# Patient Record
Sex: MALE | Race: BLACK OR AFRICAN AMERICAN | Employment: OTHER | URBAN - METROPOLITAN AREA
[De-identification: names, ages, dates, MRNs, and addresses within clinical notes are randomized per-mention and may not be internally consistent; named-entity substitution may affect disease eponyms.]

---

## 2020-11-25 ENCOUNTER — HOSPITAL ENCOUNTER (INPATIENT)
Age: 70
LOS: 4 days | Discharge: HOME | DRG: 378 | End: 2020-11-29
Attending: EMERGENCY MEDICINE | Admitting: INTERNAL MEDICINE
Payer: MEDICARE

## 2020-11-25 ENCOUNTER — APPOINTMENT (OUTPATIENT)
Dept: GENERAL RADIOLOGY | Age: 70
DRG: 378 | End: 2020-11-25
Payer: MEDICARE

## 2020-11-25 ENCOUNTER — APPOINTMENT (OUTPATIENT)
Dept: CT IMAGING | Age: 70
DRG: 378 | End: 2020-11-25
Payer: MEDICARE

## 2020-11-25 PROBLEM — K92.2 ACUTE GI BLEEDING: Status: ACTIVE | Noted: 2020-11-25

## 2020-11-25 LAB
ALBUMIN SERPL-MCNC: 4.6 G/DL (ref 3.4–5)
ALP BLD-CCNC: 64 U/L (ref 40–129)
ALT SERPL-CCNC: 15 U/L (ref 10–40)
ANION GAP SERPL CALCULATED.3IONS-SCNC: 14 MMOL/L (ref 3–16)
AST SERPL-CCNC: 22 U/L (ref 15–37)
BACTERIA: ABNORMAL /HPF
BASOPHILS ABSOLUTE: 0 K/UL (ref 0–0.2)
BASOPHILS ABSOLUTE: 0 K/UL (ref 0–0.2)
BASOPHILS RELATIVE PERCENT: 0.1 %
BASOPHILS RELATIVE PERCENT: 0.2 %
BILIRUB SERPL-MCNC: 0.4 MG/DL (ref 0–1)
BILIRUBIN DIRECT: <0.2 MG/DL (ref 0–0.3)
BILIRUBIN URINE: NEGATIVE
BILIRUBIN, INDIRECT: NORMAL MG/DL (ref 0–1)
BLOOD, URINE: NEGATIVE
BUN BLDV-MCNC: 15 MG/DL (ref 7–20)
CALCIUM SERPL-MCNC: 9.6 MG/DL (ref 8.3–10.6)
CHLORIDE BLD-SCNC: 95 MMOL/L (ref 99–110)
CLARITY: CLEAR
CO2: 26 MMOL/L (ref 21–32)
COLOR: YELLOW
CREAT SERPL-MCNC: 1 MG/DL (ref 0.8–1.3)
EKG ATRIAL RATE: 89 BPM
EKG DIAGNOSIS: NORMAL
EKG P-R INTERVAL: 280 MS
EKG Q-T INTERVAL: 374 MS
EKG QRS DURATION: 104 MS
EKG QTC CALCULATION (BAZETT): 455 MS
EKG R AXIS: 46 DEGREES
EKG T AXIS: 8 DEGREES
EKG VENTRICULAR RATE: 89 BPM
EOSINOPHILS ABSOLUTE: 0 K/UL (ref 0–0.6)
EOSINOPHILS ABSOLUTE: 0 K/UL (ref 0–0.6)
EOSINOPHILS RELATIVE PERCENT: 0 %
EOSINOPHILS RELATIVE PERCENT: 0.3 %
EPITHELIAL CELLS, UA: ABNORMAL /HPF (ref 0–5)
GFR AFRICAN AMERICAN: >60
GFR NON-AFRICAN AMERICAN: >60
GLUCOSE BLD-MCNC: 115 MG/DL (ref 70–99)
GLUCOSE BLD-MCNC: 140 MG/DL (ref 70–99)
GLUCOSE URINE: NEGATIVE MG/DL
HCT VFR BLD CALC: 34.8 % (ref 40.5–52.5)
HCT VFR BLD CALC: 36.2 % (ref 40.5–52.5)
HEMOGLOBIN: 11 G/DL (ref 13.5–17.5)
HEMOGLOBIN: 11.3 G/DL (ref 13.5–17.5)
KETONES, URINE: >=80 MG/DL
LACTIC ACID: 1.7 MMOL/L (ref 0.4–2)
LEUKOCYTE ESTERASE, URINE: NEGATIVE
LIPASE: 24 U/L (ref 13–60)
LYMPHOCYTES ABSOLUTE: 0.9 K/UL (ref 1–5.1)
LYMPHOCYTES ABSOLUTE: 0.9 K/UL (ref 1–5.1)
LYMPHOCYTES RELATIVE PERCENT: 7.7 %
LYMPHOCYTES RELATIVE PERCENT: 7.9 %
MCH RBC QN AUTO: 24.8 PG (ref 26–34)
MCH RBC QN AUTO: 25.2 PG (ref 26–34)
MCHC RBC AUTO-ENTMCNC: 31.2 G/DL (ref 31–36)
MCHC RBC AUTO-ENTMCNC: 31.6 G/DL (ref 31–36)
MCV RBC AUTO: 79.4 FL (ref 80–100)
MCV RBC AUTO: 79.9 FL (ref 80–100)
MICROSCOPIC EXAMINATION: YES
MONOCYTES ABSOLUTE: 0.6 K/UL (ref 0–1.3)
MONOCYTES ABSOLUTE: 0.6 K/UL (ref 0–1.3)
MONOCYTES RELATIVE PERCENT: 4.7 %
MONOCYTES RELATIVE PERCENT: 5.6 %
MUCUS: ABNORMAL /LPF
NEUTROPHILS ABSOLUTE: 10.3 K/UL (ref 1.7–7.7)
NEUTROPHILS ABSOLUTE: 9.6 K/UL (ref 1.7–7.7)
NEUTROPHILS RELATIVE PERCENT: 86.4 %
NEUTROPHILS RELATIVE PERCENT: 87.1 %
NITRITE, URINE: NEGATIVE
OVALOCYTES: ABNORMAL
PDW BLD-RTO: 17.1 % (ref 12.4–15.4)
PDW BLD-RTO: 17.6 % (ref 12.4–15.4)
PERFORMED ON: ABNORMAL
PH UA: 6.5 (ref 5–8)
PLATELET # BLD: 182 K/UL (ref 135–450)
PLATELET # BLD: 198 K/UL (ref 135–450)
PLATELET SLIDE REVIEW: ADEQUATE
PLATELET SLIDE REVIEW: ADEQUATE
PMV BLD AUTO: 8.6 FL (ref 5–10.5)
PMV BLD AUTO: 9 FL (ref 5–10.5)
POTASSIUM REFLEX MAGNESIUM: 4.4 MMOL/L (ref 3.5–5.1)
PROTEIN UA: ABNORMAL MG/DL
RBC # BLD: 4.36 M/UL (ref 4.2–5.9)
RBC # BLD: 4.56 M/UL (ref 4.2–5.9)
RBC UA: ABNORMAL /HPF (ref 0–4)
SLIDE REVIEW: ABNORMAL
SLIDE REVIEW: ABNORMAL
SODIUM BLD-SCNC: 135 MMOL/L (ref 136–145)
SPECIFIC GRAVITY UA: 1.02 (ref 1–1.03)
TOTAL PROTEIN: 8.2 G/DL (ref 6.4–8.2)
URINE TYPE: ABNORMAL
UROBILINOGEN, URINE: 0.2 E.U./DL
WBC # BLD: 11.1 K/UL (ref 4–11)
WBC # BLD: 11.9 K/UL (ref 4–11)
WBC UA: ABNORMAL /HPF (ref 0–5)

## 2020-11-25 PROCEDURE — C9113 INJ PANTOPRAZOLE SODIUM, VIA: HCPCS | Performed by: INTERNAL MEDICINE

## 2020-11-25 PROCEDURE — 99283 EMERGENCY DEPT VISIT LOW MDM: CPT

## 2020-11-25 PROCEDURE — 83036 HEMOGLOBIN GLYCOSYLATED A1C: CPT

## 2020-11-25 PROCEDURE — 1200000000 HC SEMI PRIVATE

## 2020-11-25 PROCEDURE — 96376 TX/PRO/DX INJ SAME DRUG ADON: CPT

## 2020-11-25 PROCEDURE — 36415 COLL VENOUS BLD VENIPUNCTURE: CPT

## 2020-11-25 PROCEDURE — 93005 ELECTROCARDIOGRAM TRACING: CPT | Performed by: INTERNAL MEDICINE

## 2020-11-25 PROCEDURE — 80076 HEPATIC FUNCTION PANEL: CPT

## 2020-11-25 PROCEDURE — 81001 URINALYSIS AUTO W/SCOPE: CPT

## 2020-11-25 PROCEDURE — 96375 TX/PRO/DX INJ NEW DRUG ADDON: CPT

## 2020-11-25 PROCEDURE — C9113 INJ PANTOPRAZOLE SODIUM, VIA: HCPCS | Performed by: EMERGENCY MEDICINE

## 2020-11-25 PROCEDURE — 93005 ELECTROCARDIOGRAM TRACING: CPT | Performed by: EMERGENCY MEDICINE

## 2020-11-25 PROCEDURE — 6360000004 HC RX CONTRAST MEDICATION: Performed by: EMERGENCY MEDICINE

## 2020-11-25 PROCEDURE — 6360000002 HC RX W HCPCS: Performed by: EMERGENCY MEDICINE

## 2020-11-25 PROCEDURE — 96374 THER/PROPH/DIAG INJ IV PUSH: CPT

## 2020-11-25 PROCEDURE — 83605 ASSAY OF LACTIC ACID: CPT

## 2020-11-25 PROCEDURE — 85025 COMPLETE CBC W/AUTO DIFF WBC: CPT

## 2020-11-25 PROCEDURE — 74022 RADEX COMPL AQT ABD SERIES: CPT

## 2020-11-25 PROCEDURE — 6370000000 HC RX 637 (ALT 250 FOR IP): Performed by: INTERNAL MEDICINE

## 2020-11-25 PROCEDURE — 2580000003 HC RX 258: Performed by: INTERNAL MEDICINE

## 2020-11-25 PROCEDURE — 6360000002 HC RX W HCPCS: Performed by: INTERNAL MEDICINE

## 2020-11-25 PROCEDURE — 74177 CT ABD & PELVIS W/CONTRAST: CPT

## 2020-11-25 PROCEDURE — 80048 BASIC METABOLIC PNL TOTAL CA: CPT

## 2020-11-25 PROCEDURE — 82272 OCCULT BLD FECES 1-3 TESTS: CPT

## 2020-11-25 PROCEDURE — 83690 ASSAY OF LIPASE: CPT

## 2020-11-25 RX ORDER — METHOCARBAMOL 750 MG/1
750 TABLET, FILM COATED ORAL 4 TIMES DAILY
COMMUNITY

## 2020-11-25 RX ORDER — PROMETHAZINE HYDROCHLORIDE 25 MG/1
12.5 TABLET ORAL EVERY 6 HOURS PRN
Status: DISCONTINUED | OUTPATIENT
Start: 2020-11-25 | End: 2020-11-28

## 2020-11-25 RX ORDER — INSULIN LISPRO 100 [IU]/ML
0.08 INJECTION, SOLUTION INTRAVENOUS; SUBCUTANEOUS
Status: DISCONTINUED | OUTPATIENT
Start: 2020-11-26 | End: 2020-11-29 | Stop reason: HOSPADM

## 2020-11-25 RX ORDER — ONDANSETRON 2 MG/ML
4 INJECTION INTRAMUSCULAR; INTRAVENOUS ONCE
Status: COMPLETED | OUTPATIENT
Start: 2020-11-25 | End: 2020-11-25

## 2020-11-25 RX ORDER — SODIUM CHLORIDE 0.9 % (FLUSH) 0.9 %
10 SYRINGE (ML) INJECTION PRN
Status: DISCONTINUED | OUTPATIENT
Start: 2020-11-25 | End: 2020-11-29 | Stop reason: HOSPADM

## 2020-11-25 RX ORDER — ASPIRIN 81 MG/1
81 TABLET ORAL DAILY
COMMUNITY

## 2020-11-25 RX ORDER — ONDANSETRON 2 MG/ML
4 INJECTION INTRAMUSCULAR; INTRAVENOUS EVERY 6 HOURS PRN
Status: DISCONTINUED | OUTPATIENT
Start: 2020-11-25 | End: 2020-11-28

## 2020-11-25 RX ORDER — INSULIN LISPRO 100 [IU]/ML
0-6 INJECTION, SOLUTION INTRAVENOUS; SUBCUTANEOUS NIGHTLY
Status: DISCONTINUED | OUTPATIENT
Start: 2020-11-25 | End: 2020-11-29 | Stop reason: HOSPADM

## 2020-11-25 RX ORDER — ACETAMINOPHEN 650 MG/1
650 SUPPOSITORY RECTAL EVERY 6 HOURS PRN
Status: DISCONTINUED | OUTPATIENT
Start: 2020-11-25 | End: 2020-11-28

## 2020-11-25 RX ORDER — INSULIN LISPRO 100 [IU]/ML
0-12 INJECTION, SOLUTION INTRAVENOUS; SUBCUTANEOUS
Status: DISCONTINUED | OUTPATIENT
Start: 2020-11-25 | End: 2020-11-29 | Stop reason: HOSPADM

## 2020-11-25 RX ORDER — MELOXICAM 7.5 MG/1
7.5 TABLET ORAL DAILY
Status: ON HOLD | COMMUNITY
End: 2020-11-29 | Stop reason: HOSPADM

## 2020-11-25 RX ORDER — ACETAMINOPHEN 325 MG/1
650 TABLET ORAL EVERY 6 HOURS PRN
Status: DISCONTINUED | OUTPATIENT
Start: 2020-11-25 | End: 2020-11-28

## 2020-11-25 RX ORDER — SENNA AND DOCUSATE SODIUM 50; 8.6 MG/1; MG/1
1 TABLET, FILM COATED ORAL 2 TIMES DAILY
Status: DISCONTINUED | OUTPATIENT
Start: 2020-11-25 | End: 2020-11-29 | Stop reason: HOSPADM

## 2020-11-25 RX ORDER — MORPHINE SULFATE 2 MG/ML
2 INJECTION, SOLUTION INTRAMUSCULAR; INTRAVENOUS
Status: DISCONTINUED | OUTPATIENT
Start: 2020-11-25 | End: 2020-11-29 | Stop reason: HOSPADM

## 2020-11-25 RX ORDER — PANTOPRAZOLE SODIUM 40 MG/10ML
40 INJECTION, POWDER, LYOPHILIZED, FOR SOLUTION INTRAVENOUS ONCE
Status: COMPLETED | OUTPATIENT
Start: 2020-11-25 | End: 2020-11-25

## 2020-11-25 RX ORDER — NICOTINE POLACRILEX 4 MG
15 LOZENGE BUCCAL PRN
Status: DISCONTINUED | OUTPATIENT
Start: 2020-11-25 | End: 2020-11-29 | Stop reason: HOSPADM

## 2020-11-25 RX ORDER — MORPHINE SULFATE 4 MG/ML
4 INJECTION, SOLUTION INTRAMUSCULAR; INTRAVENOUS ONCE
Status: COMPLETED | OUTPATIENT
Start: 2020-11-25 | End: 2020-11-25

## 2020-11-25 RX ORDER — PANTOPRAZOLE SODIUM 40 MG/10ML
40 INJECTION, POWDER, LYOPHILIZED, FOR SOLUTION INTRAVENOUS 2 TIMES DAILY
Status: DISCONTINUED | OUTPATIENT
Start: 2020-11-25 | End: 2020-11-26

## 2020-11-25 RX ORDER — DEXTROSE MONOHYDRATE 50 MG/ML
100 INJECTION, SOLUTION INTRAVENOUS PRN
Status: DISCONTINUED | OUTPATIENT
Start: 2020-11-25 | End: 2020-11-29 | Stop reason: HOSPADM

## 2020-11-25 RX ORDER — GLIPIZIDE 5 MG/1
5 TABLET ORAL
COMMUNITY

## 2020-11-25 RX ORDER — SODIUM CHLORIDE, SODIUM LACTATE, POTASSIUM CHLORIDE, CALCIUM CHLORIDE 600; 310; 30; 20 MG/100ML; MG/100ML; MG/100ML; MG/100ML
INJECTION, SOLUTION INTRAVENOUS CONTINUOUS
Status: DISCONTINUED | OUTPATIENT
Start: 2020-11-25 | End: 2020-11-29 | Stop reason: HOSPADM

## 2020-11-25 RX ORDER — SODIUM CHLORIDE 0.9 % (FLUSH) 0.9 %
10 SYRINGE (ML) INJECTION EVERY 12 HOURS SCHEDULED
Status: DISCONTINUED | OUTPATIENT
Start: 2020-11-25 | End: 2020-11-28

## 2020-11-25 RX ORDER — DEXTROSE MONOHYDRATE 25 G/50ML
12.5 INJECTION, SOLUTION INTRAVENOUS PRN
Status: DISCONTINUED | OUTPATIENT
Start: 2020-11-25 | End: 2020-11-29 | Stop reason: HOSPADM

## 2020-11-25 RX ADMIN — PANTOPRAZOLE SODIUM 40 MG: 40 INJECTION, POWDER, FOR SOLUTION INTRAVENOUS at 22:50

## 2020-11-25 RX ADMIN — PANTOPRAZOLE SODIUM 40 MG: 40 INJECTION, POWDER, FOR SOLUTION INTRAVENOUS at 17:18

## 2020-11-25 RX ADMIN — SODIUM CHLORIDE, SODIUM LACTATE, POTASSIUM CHLORIDE, AND CALCIUM CHLORIDE: 600; 310; 30; 20 INJECTION, SOLUTION INTRAVENOUS at 22:50

## 2020-11-25 RX ADMIN — MORPHINE SULFATE 4 MG: 4 INJECTION INTRAVENOUS at 14:34

## 2020-11-25 RX ADMIN — ONDANSETRON 4 MG: 2 INJECTION INTRAMUSCULAR; INTRAVENOUS at 14:34

## 2020-11-25 RX ADMIN — INSULIN LISPRO 1 UNITS: 100 INJECTION, SOLUTION INTRAVENOUS; SUBCUTANEOUS at 22:50

## 2020-11-25 RX ADMIN — DOCUSATE SODIUM 50 MG AND SENNOSIDES 8.6 MG 1 TABLET: 8.6; 5 TABLET, FILM COATED ORAL at 22:50

## 2020-11-25 RX ADMIN — MORPHINE SULFATE 4 MG: 4 INJECTION INTRAVENOUS at 17:18

## 2020-11-25 RX ADMIN — ONDANSETRON 4 MG: 2 INJECTION INTRAMUSCULAR; INTRAVENOUS at 17:18

## 2020-11-25 RX ADMIN — IOPAMIDOL 80 ML: 755 INJECTION, SOLUTION INTRAVENOUS at 15:16

## 2020-11-25 RX ADMIN — Medication 10 ML: at 22:51

## 2020-11-25 ASSESSMENT — ENCOUNTER SYMPTOMS
SHORTNESS OF BREATH: 0
VOMITING: 1
NAUSEA: 1
ABDOMINAL PAIN: 1
CHEST TIGHTNESS: 0

## 2020-11-25 ASSESSMENT — PAIN DESCRIPTION - PAIN TYPE
TYPE: ACUTE PAIN

## 2020-11-25 ASSESSMENT — PAIN DESCRIPTION - ORIENTATION
ORIENTATION: MID

## 2020-11-25 ASSESSMENT — PAIN DESCRIPTION - DESCRIPTORS
DESCRIPTORS: CONSTANT

## 2020-11-25 ASSESSMENT — PAIN DESCRIPTION - LOCATION
LOCATION: ABDOMEN

## 2020-11-25 ASSESSMENT — PAIN SCALES - GENERAL
PAINLEVEL_OUTOF10: 7

## 2020-11-25 ASSESSMENT — PAIN DESCRIPTION - FREQUENCY
FREQUENCY: CONTINUOUS

## 2020-11-25 ASSESSMENT — PAIN DESCRIPTION - ONSET
ONSET: ON-GOING
ONSET: ON-GOING

## 2020-11-25 ASSESSMENT — PAIN - FUNCTIONAL ASSESSMENT
PAIN_FUNCTIONAL_ASSESSMENT: ACTIVITIES ARE NOT PREVENTED
PAIN_FUNCTIONAL_ASSESSMENT: ACTIVITIES ARE NOT PREVENTED

## 2020-11-25 ASSESSMENT — PAIN DESCRIPTION - PROGRESSION
CLINICAL_PROGRESSION: NOT CHANGED
CLINICAL_PROGRESSION: NOT CHANGED

## 2020-11-25 NOTE — ED TRIAGE NOTES
Pt c/o abd pain that started on Sunday, with vomiting today. Pt thought he may be constipated, so he took magnesium citrate with good result.

## 2020-11-25 NOTE — ED PROVIDER NOTES
[address] [facility]          ATTENDING PHYSICIAN NOTE       Date of evaluation: 11/25/2020    ADDENDUM:      Care of this patient was assumed from Dr Santo Rodas. The patient was seen for Abdominal Pain and Emesis  . The patient's initial evaluation and plan have been discussed with the prior provider who initially evaluated the patient. Nursing Notes, Past Medical Hx, Past Surgical Hx, Social Hx, Allergies, and Family Hx were all reviewed. Diagnostic Results     EKG   See prior provider note for details    RADIOLOGY:  CT ABDOMEN PELVIS W IV CONTRAST Additional Contrast? None   Final Result      1. No acute abnormality in the abdomen and pelvis.          XR ACUTE ABD SERIES CHEST 1 VW   Final Result          LABS:   Results for orders placed or performed during the hospital encounter of 11/25/20   CBC auto differential   Result Value Ref Range    WBC 11.1 (H) 4.0 - 11.0 K/uL    RBC 4.56 4.20 - 5.90 M/uL    Hemoglobin 11.3 (L) 13.5 - 17.5 g/dL    Hematocrit 36.2 (L) 40.5 - 52.5 %    MCV 79.4 (L) 80.0 - 100.0 fL    MCH 24.8 (L) 26.0 - 34.0 pg    MCHC 31.2 31.0 - 36.0 g/dL    RDW 17.6 (H) 12.4 - 15.4 %    Platelets 197 169 - 566 K/uL    MPV 9.0 5.0 - 10.5 fL    PLATELET SLIDE REVIEW Adequate     SLIDE REVIEW see below     Neutrophils % 86.4 %    Lymphocytes % 7.9 %    Monocytes % 5.6 %    Eosinophils % 0.0 %    Basophils % 0.1 %    Neutrophils Absolute 9.6 (H) 1.7 - 7.7 K/uL    Lymphocytes Absolute 0.9 (L) 1.0 - 5.1 K/uL    Monocytes Absolute 0.6 0.0 - 1.3 K/uL    Eosinophils Absolute 0.0 0.0 - 0.6 K/uL    Basophils Absolute 0.0 0.0 - 0.2 K/uL   Basic Metabolic Panel w/ Reflex to MG   Result Value Ref Range    Sodium 135 (L) 136 - 145 mmol/L    Potassium reflex Magnesium 4.4 3.5 - 5.1 mmol/L    Chloride 95 (L) 99 - 110 mmol/L    CO2 26 21 - 32 mmol/L    Anion Gap 14 3 - 16    Glucose 115 (H) 70 - 99 mg/dL    BUN 15 7 - 20 mg/dL    CREATININE 1.0 0.8 - 1.3 mg/dL    GFR Non- American >60 >60    GFR African American >60 >60    Calcium 9.6 8.3 - 10.6 mg/dL   Lipase   Result Value Ref Range    Lipase 24.0 13.0 - 60.0 U/L   Hepatic function panel (LFTs)   Result Value Ref Range    Total Protein 8.2 6.4 - 8.2 g/dL    Alb 4.6 3.4 - 5.0 g/dL    Alkaline Phosphatase 64 40 - 129 U/L    ALT 15 10 - 40 U/L    AST 22 15 - 37 U/L    Total Bilirubin 0.4 0.0 - 1.0 mg/dL    Bilirubin, Direct <0.2 0.0 - 0.3 mg/dL    Bilirubin, Indirect see below 0.0 - 1.0 mg/dL   Urinalysis   Result Value Ref Range    Color, UA Yellow Straw/Yellow    Clarity, UA Clear Clear    Glucose, Ur Negative Negative mg/dL    Bilirubin Urine Negative Negative    Ketones, Urine >=80 (A) Negative mg/dL    Specific Gravity, UA 1.020 1.005 - 1.030    Blood, Urine Negative Negative    pH, UA 6.5 5.0 - 8.0    Protein, UA TRACE (A) Negative mg/dL    Urobilinogen, Urine 0.2 <2.0 E.U./dL    Nitrite, Urine Negative Negative    Leukocyte Esterase, Urine Negative Negative    Microscopic Examination YES     Urine Type NotGiven    Microscopic Urinalysis   Result Value Ref Range    Mucus, UA 1+ (A) None Seen /LPF    WBC, UA 3-5 0 - 5 /HPF    RBC, UA None seen 0 - 4 /HPF    Epithelial Cells, UA 2-5 0 - 5 /HPF    Bacteria, UA Rare (A) None Seen /HPF   EKG 12 Lead   Result Value Ref Range    Ventricular Rate 89 BPM    Atrial Rate 89 BPM    P-R Interval 280 ms    QRS Duration 104 ms    Q-T Interval 374 ms    QTc Calculation (Bazett) 455 ms    R Axis 46 degrees    T Axis 8 degrees    Diagnosis       EKG performed in ER and to be interpreted by ER physician. Confirmed by MD, ER ([id number]),  Sunny Anthony ([id number]) on 11/25/2020 2:55:25 PM       RECENT VITALS:  BP: 122/88, Temp: 98.3 °F (36.8 °C), Pulse: 92, Resp: 14, SpO2: 99 %     Procedures     none    ED Course     The patient was given the following medications:  Orders Placed This Encounter   Medications    ondansetron (ZOFRAN) injection 4 mg    morphine injection 4 mg    iopamidol (ISOVUE-370) 76 % injection 80 mL    morphine injection 4 mg    pantoprazole (PROTONIX) injection 40 mg    ondansetron (ZOFRAN) injection 4 mg       CONSULTS:  IP CONSULT TO GI  IP CONSULT TO HOSPITALIST    MEDICAL DECISION MAKING / ASSESSMENT / Candice Latonya is a 65 y.o. male who presented with CC of abd pain, emesis. Initial provider had concern for small bowel obstruction versus other acutely emergent intra-abdominal complaint such as perforated ulcer, peptic ulcer disease with active bleeding given black stools. CT scan here unremarkable, hemoglobin 11.5. Prior hemoglobin from 2016 is 14. Patient with continued pain and nausea although no further vomiting. I was able to speak with his GI doctor who stated that they would be unable to get him in for an outpatient endoscopy until early till mid next week, and although they agreed I could have a risk-benefit discussion with the patient about possibly finishing his work-up as an outpatient they felt like this may be inappropriate given his symptoms and not normal hemoglobin. Therefore after discussing with the patient, he will be admitted to see our GI doctors here, and for serial hemoglobins. Patient stable on several reassessments. I did give him morphine, Zofran, Protonix. Clinical Impression     1. Abdominal pain, epigastric    2. Black stools        Disposition     PATIENT REFERRED TO:  No follow-up provider specified.     DISCHARGE MEDICATIONS:  New Prescriptions    No medications on file       DISPOSITION Admitted 11/25/2020 05:59:31 PM       Malika Morgan MD  11/25/20 [id number]

## 2020-11-25 NOTE — ED PROVIDER NOTES
[address] [facility]          ATTENDING PHYSICIAN NOTE       Date of evaluation: 11/25/2020    Chief Complaint     Abdominal Pain and Emesis      History of Present Illness     Rodolfo Ramos is a 65 y.o. male who presents with abdominal pain x severla days    Location: periumbilical abdominal pain   Onset: several days ago  Quality: sharp. , crampy  Radiation: none  Severity: moderate  Exacerbating or relieving factors: not worse after eating, slightly better after defecation  Associated factors: black stools x 2 days, NBNB emesis, denied fevers/chills    H/o prior heavy alcohol use, last use was 1 year ago  H/o regular NSAID use  Last coloscopy was Oct 2020, routine, no significant findings  Prior abdominal surgeries: umbilical hernia repair, choley  Denied any chest pain, chest pressure, SOA      Review of Systems     Review of Systems   Constitutional: Positive for appetite change. Negative for activity change, chills and fever. Respiratory: Negative for chest tightness and shortness of breath. Cardiovascular: Negative for chest pain. Gastrointestinal: Positive for abdominal pain, nausea and vomiting. Periumbilical abdominal pain, black stools   Genitourinary: Negative for dysuria and hematuria. Musculoskeletal: Negative. Skin: Negative. Neurological: Negative for dizziness and syncope. Past Medical, Surgical, Family, and Social History     He has a past medical history of Diabetes mellitus ([address] He has a past surgical history that includes Cholecystectomy. His family history is not on file. He reports that he has never smoked. He has never used smokeless tobacco. He reports previous alcohol use. He reports previous drug use.     Medications     Previous Medications    ASPIRIN 81 MG EC TABLET    Take 81 mg by mouth daily    GLIPIZIDE (GLUCOTROL) 5 MG TABLET    Take 5 mg by mouth 2 times daily (before meals)    LINAGLIPTIN (TRADJENTA) 5 MG TABLET    Take 5 mg by mouth daily    MELOXICAM (MOBIC) 7.5 MG TABLET    Take 7.5 mg by mouth daily    METFORMIN (GLUCOPHAGE) 1000 MG TABLET    Take 500 mg by mouth 2 times daily (with meals)    METHOCARBAMOL (ROBAXIN) 750 MG TABLET    Take 750 mg by mouth 4 times daily       Allergies     He has No Known Allergies. Physical Exam     INITIAL VITALS: BP: (!) 152/81, Temp: 98.3 °F (36.8 °C), Pulse: 92, Resp: 14, SpO2: 100 %   Physical Exam  Vitals signs reviewed. Constitutional:       Appearance: Normal appearance. He is obese. Cardiovascular:      Rate and Rhythm: Normal rate and regular rhythm. Pulses: Normal pulses. Heart sounds: Normal heart sounds. Pulmonary:      Effort: Pulmonary effort is normal.      Breath sounds: Normal breath sounds. No stridor. No wheezing, rhonchi or rales. Abdominal:      General: Abdomen is flat. There is distension. Palpations: Abdomen is soft. Tenderness: There is abdominal tenderness. There is no guarding or rebound. Comments: Distended, tympanic to percussion, periumbilical ttp   Musculoskeletal: Normal range of motion. Skin:     General: Skin is warm and dry. Capillary Refill: Capillary refill takes less than 2 seconds. Neurological:      General: No focal deficit present. Mental Status: He is alert and oriented to person, place, and time. Mental status is at baseline. Diagnostic Results     EKG at 1436  EKG Interpretation    Interpreted by me    Rhythm: normal sinus   Rate: normal  Axis: normal  Ectopy: none  Conduction: normal  ST Segments: no acute change  T Waves: no acute change  Q Waves: none    Clinical Impression: no acute infarction      RADIOLOGY:  XR ACUTE ABD SERIES CHEST 1 VW    (Results Pending)       LABS:   No results found for this visit on 11/25/20.       RECENT VITALS:  BP: (!) 152/81,Temp: 98.3 °F (36.8 °C), Pulse: 92, Resp: 14, SpO2: 100 %     Procedures     none    ED Course     Nursing Notes, Past Medical Hx, Past Surgical Hx, Social Hx,Allergies, and Family Hx were reviewed. patient was given the following medications:  No orders of the defined types were placed in this encounter. CONSULTS:  None    MEDICAL DECISIONMAKING / ASSESSMENT / PLAN     Yuriy Chery is a 65 y.o. male who presents with several days of periumbilical abdominal pain, described as sharp or crampy, not worse with eating, slightly improved after defecation, has been intermittent. Patient has had several episodes of nonbloody nonbilious emesis with this. He also had several episodes of black stools. He has a history of prior alcohol use, does not currently drink alcohol, history of regular NSAID use as well. Patient had a screening colonoscopy last month that was negative for any acute findings including diverticulosis or diverticulitis. Patient denied any recent travel or fevers or chills. On my exam patient's abdomen was distended and tender to palpation in periumbilical region, no ascites or fluid wave, rectal exam was performed with JENNY Fink at bedside which showed black stools, Hemoccult positive    1515 transfer of care to oncoming provider pending results of labs, CT scan, and ultimate dispo       Clinical Impression     No diagnosis found. Disposition     PATIENT REFERRED TO:  No follow-up provider specified.     DISCHARGE MEDICATIONS:  New Prescriptions    No medications on file       Alberta Reynolds MD  11/26/20 4933

## 2020-11-26 LAB
ANION GAP SERPL CALCULATED.3IONS-SCNC: 9 MMOL/L (ref 3–16)
BUN BLDV-MCNC: 13 MG/DL (ref 7–20)
CALCIUM SERPL-MCNC: 9 MG/DL (ref 8.3–10.6)
CHLORIDE BLD-SCNC: 98 MMOL/L (ref 99–110)
CO2: 27 MMOL/L (ref 21–32)
CREAT SERPL-MCNC: 0.8 MG/DL (ref 0.8–1.3)
ESTIMATED AVERAGE GLUCOSE: 119.8 MG/DL
FERRITIN: 14.1 NG/ML (ref 30–400)
GFR AFRICAN AMERICAN: >60
GFR NON-AFRICAN AMERICAN: >60
GLUCOSE BLD-MCNC: 67 MG/DL (ref 70–99)
GLUCOSE BLD-MCNC: 73 MG/DL (ref 70–99)
GLUCOSE BLD-MCNC: 74 MG/DL (ref 70–99)
GLUCOSE BLD-MCNC: 76 MG/DL (ref 70–99)
GLUCOSE BLD-MCNC: 98 MG/DL (ref 70–99)
HBA1C MFR BLD: 5.8 %
HCT VFR BLD CALC: 30.6 % (ref 40.5–52.5)
HCT VFR BLD CALC: 31.6 % (ref 40.5–52.5)
HEMOGLOBIN: 10.3 G/DL (ref 13.5–17.5)
HEMOGLOBIN: 9.8 G/DL (ref 13.5–17.5)
IRON SATURATION: 12 % (ref 20–50)
IRON: 49 UG/DL (ref 59–158)
MAGNESIUM: 2.1 MG/DL (ref 1.8–2.4)
MCH RBC QN AUTO: 25.3 PG (ref 26–34)
MCHC RBC AUTO-ENTMCNC: 32.4 G/DL (ref 31–36)
MCV RBC AUTO: 78.1 FL (ref 80–100)
PDW BLD-RTO: 17.2 % (ref 12.4–15.4)
PERFORMED ON: ABNORMAL
PERFORMED ON: NORMAL
PLATELET # BLD: 165 K/UL (ref 135–450)
PMV BLD AUTO: 8.5 FL (ref 5–10.5)
POTASSIUM REFLEX MAGNESIUM: 4.2 MMOL/L (ref 3.5–5.1)
RBC # BLD: 4.05 M/UL (ref 4.2–5.9)
SODIUM BLD-SCNC: 134 MMOL/L (ref 136–145)
TOTAL IRON BINDING CAPACITY: 406 UG/DL (ref 260–445)
TROPONIN: <0.01 NG/ML
TROPONIN: <0.01 NG/ML
WBC # BLD: 9.7 K/UL (ref 4–11)

## 2020-11-26 PROCEDURE — C9113 INJ PANTOPRAZOLE SODIUM, VIA: HCPCS | Performed by: INTERNAL MEDICINE

## 2020-11-26 PROCEDURE — 85014 HEMATOCRIT: CPT

## 2020-11-26 PROCEDURE — 82728 ASSAY OF FERRITIN: CPT

## 2020-11-26 PROCEDURE — 83540 ASSAY OF IRON: CPT

## 2020-11-26 PROCEDURE — 83550 IRON BINDING TEST: CPT

## 2020-11-26 PROCEDURE — 85018 HEMOGLOBIN: CPT

## 2020-11-26 PROCEDURE — 6360000002 HC RX W HCPCS: Performed by: INTERNAL MEDICINE

## 2020-11-26 PROCEDURE — 83735 ASSAY OF MAGNESIUM: CPT

## 2020-11-26 PROCEDURE — 80048 BASIC METABOLIC PNL TOTAL CA: CPT

## 2020-11-26 PROCEDURE — 6370000000 HC RX 637 (ALT 250 FOR IP): Performed by: INTERNAL MEDICINE

## 2020-11-26 PROCEDURE — 2580000003 HC RX 258: Performed by: INTERNAL MEDICINE

## 2020-11-26 PROCEDURE — 85027 COMPLETE CBC AUTOMATED: CPT

## 2020-11-26 PROCEDURE — 1200000000 HC SEMI PRIVATE

## 2020-11-26 PROCEDURE — 36415 COLL VENOUS BLD VENIPUNCTURE: CPT

## 2020-11-26 PROCEDURE — 84484 ASSAY OF TROPONIN QUANT: CPT

## 2020-11-26 RX ADMIN — SODIUM CHLORIDE, SODIUM LACTATE, POTASSIUM CHLORIDE, AND CALCIUM CHLORIDE: 600; 310; 30; 20 INJECTION, SOLUTION INTRAVENOUS at 09:00

## 2020-11-26 RX ADMIN — PANTOPRAZOLE SODIUM 40 MG: 40 INJECTION, POWDER, FOR SOLUTION INTRAVENOUS at 09:00

## 2020-11-26 RX ADMIN — IRON SUCROSE 200 MG: 20 INJECTION, SOLUTION INTRAVENOUS at 12:29

## 2020-11-26 RX ADMIN — PANTOPRAZOLE SODIUM 8 MG/HR: 40 INJECTION, POWDER, FOR SOLUTION INTRAVENOUS at 14:01

## 2020-11-26 RX ADMIN — DOCUSATE SODIUM 50 MG AND SENNOSIDES 8.6 MG 1 TABLET: 8.6; 5 TABLET, FILM COATED ORAL at 09:06

## 2020-11-26 RX ADMIN — PANTOPRAZOLE SODIUM 8 MG/HR: 40 INJECTION, POWDER, FOR SOLUTION INTRAVENOUS at 23:34

## 2020-11-26 RX ADMIN — DOCUSATE SODIUM 50 MG AND SENNOSIDES 8.6 MG 1 TABLET: 8.6; 5 TABLET, FILM COATED ORAL at 20:33

## 2020-11-26 ASSESSMENT — PAIN SCALES - GENERAL
PAINLEVEL_OUTOF10: 0
PAINLEVEL_OUTOF10: 0

## 2020-11-26 NOTE — PLAN OF CARE
Problem: Pain:  Goal: Pain level will decrease  Description: Pain level will decrease  Outcome: Ongoing  Note: Pt alert x 4. Denies any cp or epigastric pain at this time. Seen by GI and cardiology. Plan for egd and stress test tomorrow. Pt aware. On clears now, npo after MN.  VSS

## 2020-11-26 NOTE — PROGRESS NOTES
Hospitalist Progress Note      PCP: No primary care provider on file. Date of Admission: 11/25/2020    Chief Complaint: abd pain, vomiting,    Hospital Course:   65 y.o. male who has PMHx of T2DM, Vitamin D deficiency, DDD Lumbar, who presented to [facility] [place]. with abdominal pain, emesis. Abdominal pain   Location: periumbilical abdominal pain   Onset: several days ago  Quality: sharp. , crampy  Radiation: none  Severity: moderate  Exacerbating - unable to define  Relieving factors: did get some relief with tums and pepto bismol at home  No changes with food intake  Associated factors: black stools x 2 days, NBNB emesis, denied fevers/chills    He has hx of heavy Etoh use, last use 1 yr ago  He is former smoker 0.5 PPD and quit one year back  He has been using Advil 4-6 pills daily for low back pain  He also tells me that he has been having left sided chest pain when he goes up and down the stairs and improves with rest  Last Colonoscopy was done 10/2020- no significant findings, scope results as below  Prior abdominal surgeries: umbilical hernia repair, cholycystectomy    In the ED small bowel obstruction versus other acutely emergent intra-abdominal complaint such as perforated ulcer, peptic ulcer disease with active bleeding given black stools. CT scan here unremarkable, hemoglobin 11.5. Prior hemoglobin from 2016 is 14.     Subjective:  Says pain is better, no complains of nausea  No more loose stools since last night      Medications:  Reviewed    Infusion Medications    dextrose      lactated ringers 100 mL/hr at 11/26/20 0900     Scheduled Medications    iron sucrose  200 mg Intravenous Once    insulin lispro  0.08 Units/kg Subcutaneous TID WC    insulin lispro  0-12 Units Subcutaneous TID WC    insulin lispro  0-6 Units Subcutaneous Nightly    pantoprazole  40 mg Intravenous BID    sodium chloride flush  10 mL Intravenous 2 times per day    sennosides-docusate sodium  1 tablet Oral BID PRN Meds: glucose, dextrose, glucagon (rDNA), dextrose, morphine, sodium chloride flush, acetaminophen **OR** acetaminophen, magnesium hydroxide, promethazine **OR** ondansetron      Intake/Output Summary (Last 24 hours) at 11/26/2020 1129  Last data filed at 11/26/2020 0275  Gross per 24 hour   Intake 120 ml   Output 400 ml   Net -280 ml       Physical Exam Performed:    BP (!) 101/55   Pulse 62   Temp 97.5 °F (36.4 °C) (Oral)   Resp 16   Ht 6' 3\" (1.905 m)   Wt (!) 331 lb (150.1 kg)   SpO2 96%   BMI 41.37 kg/m²     General appearance: Morbidly obese male, no apparent distress, appears stated age and cooperative. HEENT: Pupils equal, round, and reactive to light. Conjunctivae/corneas clear. Neck: Supple, with full range of motion. No jugular venous distention. Trachea midline. Respiratory:  Normal respiratory effort. Clear to auscultation, bilaterally without Rales/Wheezes/Rhonchi. Cardiovascular: Regular rate and rhythm with normal S1/S2 without murmurs, rubs or gallops. Abdomen: Soft, non-tender, non-distended with normal bowel sounds. Musculoskeletal: No clubbing, cyanosis or edema bilaterally. Full range of motion without deformity. Skin: Skin color, texture, turgor normal.  No rashes or lesions. Neurologic:  Neurovascularly intact without any focal sensory/motor deficits.  Cranial nerves: II-XII intact, grossly non-focal.  Psychiatric: Alert and oriented, thought content appropriate, normal insight  Capillary Refill: Brisk,< 3 seconds   Peripheral Pulses: +2 palpable, equal bilaterally       Labs:   Recent Labs     11/25/20  1405 11/25/20  1832 11/26/20  0621   WBC 11.1* 11.9* 9.7   HGB 11.3* 11.0* 10.3*   HCT 36.2* 34.8* 31.6*    182 165     Recent Labs     11/25/20  1405 11/26/20  0621   * 134*   K 4.4 4.2   CL 95* 98*   CO2 26 27   BUN 15 13   CREATININE 1.0 0.8   CALCIUM 9.6 9.0     Recent Labs     11/25/20  1405   AST 22   ALT 15   BILIDIR <0.2   BILITOT 0.4   ALKPHOS 64 and treatment. Placing patient at risk for multiple co-morbidities as well as early death and contributing to the patient's presentation.  on weight loss when appropriate.     Plan:  - Keep patient Npo  - GI consulted in ED, possible intervention on Friday  - Continue  mL/hr  - Continue -h/h q12h  - Check Iron profile, give dose of Venofer once collected  - Protonix gtt  - no AC/AP, SCDs  - Hold oral hypoglycemic agents, LDSSI  - Consult cardiology for evaluation  - Trop X 2  - No NSAIDs!  - DVT Prophylaxis: SCDs  - Diet: DIET CLEAR LIQUID;  Diet NPO, After Midnight  - Code Status: Full Code    PT/OT Eval Status: N/A    Dispo - Continue inpatient care    Marcela Kenny MD   Hospitalist    12:18 PM  Will get Gee Lombardi in am  No Caffiene, NPO after midnight

## 2020-11-26 NOTE — H&P
Hospital Medicine History & Physical      PCP: No primary care provider on file. Date of Admission: 11/25/2020    Date of Service: 11/25/2020    Pt seen/examined on 11/25/2020    Admitted to Inpatient with expected LOS greater than two midnights due to medical therapy. Chief Complaint:     Chief Complaint   Patient presents with    Abdominal Pain    Emesis       History Of Present Illness:      65 y.o. male who presented to [facility] with dark stools beginning 2 days ago a/w epigastric pain and 2 episodes of non-bloody emesis that has persisted until presentation. He had a negative screening colonoscopy in August.    Past Medical History:          Diagnosis Date    Diabetes mellitus ([address]        Past Surgical History:          Procedure Laterality Date    CHOLECYSTECTOMY         Medications Prior to Admission:      Prior to Admission medications    Medication Sig Start Date End Date Taking? Authorizing Provider   metFORMIN (GLUCOPHAGE) 1000 MG tablet Take 500 mg by mouth 2 times daily (with meals)   Yes Historical Provider, MD   linagliptin (TRADJENTA) 5 MG tablet Take 5 mg by mouth daily   Yes Historical Provider, MD   meloxicam (MOBIC) 7.5 MG tablet Take 7.5 mg by mouth daily   Yes Historical Provider, MD   glipiZIDE (GLUCOTROL) 5 MG tablet Take 5 mg by mouth 2 times daily (before meals)   Yes Historical Provider, MD   methocarbamol (ROBAXIN) 750 MG tablet Take 750 mg by mouth 4 times daily   Yes Historical Provider, MD   aspirin 81 MG EC tablet Take 81 mg by mouth daily   Yes Historical Provider, MD       Allergies:  Patient has no known allergies. Social History:      TOBACCO:   reports that he has never smoked. He has never used smokeless tobacco.  ETOH:   reports previous alcohol use. Family History:      Reviewed in detail and negative except as follows:    History reviewed. No pertinent family history.     REVIEW OF SYSTEMS:   Pertinent positives and negatives as noted in the HPI. All other systems reviewed and negative. PHYSICAL EXAM PERFORMED:    /71   Pulse 80   Temp 98.3 °F (36.8 °C) (Oral)   Resp 15   Ht 6' 3\" (1.905 m)   Wt (!) 347 lb (157.4 kg)   SpO2 94%   BMI 43.37 kg/m²     General appearance:  No acute distress, appears stated age  Eyes: Pupils equal, round, reactive to light, conjunctiva/corneas clear  Ears/Nose/Mouth/Throat: No external lesions or scars, hearing intact to voice  Neck: Trachea midline, no masses noted, no thyromegaly  Respiratory:  Non-labored breathing, clear to auscultation bilaterally  Cardiovascular: Regular rate and rhythm, no murmurs, gallops, or rubs  Abdomen: epigastric tenderness, soft  Musculoskeletal: Warm, well perfused, no cyanosis or edema  Skin: normal color, no wounds noted  Psychiatric: A&Ox4, good insight and judgment    Labs:     Recent Labs     11/25/20  1405 11/25/20  1832   WBC 11.1* 11.9*   HGB 11.3* 11.0*   HCT 36.2* 34.8*    182     Recent Labs     11/25/20  1405   *   K 4.4   CL 95*   CO2 26   BUN 15   CREATININE 1.0   CALCIUM 9.6     Recent Labs     11/25/20  1405   AST 22   ALT 15   BILIDIR <0.2   BILITOT 0.4   ALKPHOS 64     No results for input(s): INR in the last 72 hours. No results for input(s): Kwan Seed in the last 72 hours. Urinalysis:      Lab Results   Component Value Date    NITRU Negative 11/25/2020    WBCUA 3-5 11/25/2020    BACTERIA Rare 11/25/2020    RBCUA None seen 11/25/2020    BLOODU Negative 11/25/2020    SPECGRAV 1.020 11/25/2020    GLUCOSEU Negative 11/25/2020       Radiology:     CT ABDOMEN PELVIS W IV CONTRAST Additional Contrast? None   Final Result      1. No acute abnormality in the abdomen and pelvis.          XR ACUTE ABD SERIES CHEST 1 VW   Final Result          ASSESSMENT:    Active Hospital Problems    Diagnosis Date Noted    Acute GI bleeding [K92.2] 11/25/2020       PLAN:    # Acute GI bleed  -NPO  -IV fluids  -check lactate  -h/h q12h  -GI consult  -IV protonix BID  -tele  -no AC/AP, SCDs    # T2DM  -hold PO meds  -carb control diet when able  -mealtime and correctional insulin    DVT Prophylaxis: SCDs  Diet: No diet orders on file  Code Status: No Order    PT/OT Eval Status: Ongoing    Dispo: Zaki Mtahew pending clinical improvement     Dalton Macdonald MD    Thank you No primary care provider on file. for the opportunity to be involved in this patient's care. If you have any questions or concerns please feel free to contact me at [phone].

## 2020-11-26 NOTE — PROGRESS NOTES
Admission: Patient received to room 6308 from ED. Patient admitted with Dx of GI bleed. Patient A&Ox4 upon arrival. Tele monitor applied, rate and rhythm verified with monitor reader. VSS. Patient oriented to room, staff, and call system. Educated on fall protocol and hourly rounding. Assessment as documented. Admission navigator complete. IVF infusing. Bed locked in low position. Patient informed to utilize call light with any needs. Pt verbalized understanding. Call light within reach.  Will continue to monitor. '

## 2020-11-26 NOTE — CONSULTS
Clinical Pharmacy Progress Note  Medication History     Admit Date: 11/25/2020    List of of current medications patient is taking is complete. Home Medication list in EPIC updated to reflect changes noted below. Source of information: [place]  [facility]'s [facility]: [facility] [phone]    No changes were made to the medication list:  Other notes:    Patient is requesting a prescription for aspirin at discharge; he is able to fill through insurance      Please call with any questions.   Zoraida Mahan, PriceD  PGY1 Pharmacy Resident  11/26/2020 2:46 PM   [id number]

## 2020-11-27 ENCOUNTER — ANESTHESIA (OUTPATIENT)
Dept: ENDOSCOPY | Age: 70
DRG: 378 | End: 2020-11-27
Payer: MEDICARE

## 2020-11-27 ENCOUNTER — ANESTHESIA EVENT (OUTPATIENT)
Dept: ENDOSCOPY | Age: 70
DRG: 378 | End: 2020-11-27
Payer: MEDICARE

## 2020-11-27 LAB
ANION GAP SERPL CALCULATED.3IONS-SCNC: 5 MMOL/L (ref 3–16)
BUN BLDV-MCNC: 10 MG/DL (ref 7–20)
CALCIUM SERPL-MCNC: 8.9 MG/DL (ref 8.3–10.6)
CHLORIDE BLD-SCNC: 99 MMOL/L (ref 99–110)
CO2: 30 MMOL/L (ref 21–32)
CREAT SERPL-MCNC: 0.9 MG/DL (ref 0.8–1.3)
GFR AFRICAN AMERICAN: >60
GFR NON-AFRICAN AMERICAN: >60
GLUCOSE BLD-MCNC: 103 MG/DL (ref 70–99)
GLUCOSE BLD-MCNC: 87 MG/DL (ref 70–99)
GLUCOSE BLD-MCNC: 87 MG/DL (ref 70–99)
GLUCOSE BLD-MCNC: 93 MG/DL (ref 70–99)
GLUCOSE BLD-MCNC: 94 MG/DL (ref 70–99)
HCT VFR BLD CALC: 33 % (ref 40.5–52.5)
HCT VFR BLD CALC: 34.7 % (ref 40.5–52.5)
HEMOGLOBIN: 10.5 G/DL (ref 13.5–17.5)
HEMOGLOBIN: 11.2 G/DL (ref 13.5–17.5)
LV EF: 54 %
LVEF MODALITY: NORMAL
MCH RBC QN AUTO: 25.3 PG (ref 26–34)
MCHC RBC AUTO-ENTMCNC: 31.9 G/DL (ref 31–36)
MCV RBC AUTO: 79.3 FL (ref 80–100)
PDW BLD-RTO: 17.3 % (ref 12.4–15.4)
PERFORMED ON: ABNORMAL
PERFORMED ON: NORMAL
PLATELET # BLD: 161 K/UL (ref 135–450)
PMV BLD AUTO: 8.8 FL (ref 5–10.5)
POC OCCULT BLOOD STOOL: POSITIVE
POTASSIUM REFLEX MAGNESIUM: 4.4 MMOL/L (ref 3.5–5.1)
RBC # BLD: 4.16 M/UL (ref 4.2–5.9)
SODIUM BLD-SCNC: 134 MMOL/L (ref 136–145)
WBC # BLD: 6.8 K/UL (ref 4–11)

## 2020-11-27 PROCEDURE — 78452 HT MUSCLE IMAGE SPECT MULT: CPT

## 2020-11-27 PROCEDURE — 85014 HEMATOCRIT: CPT

## 2020-11-27 PROCEDURE — 6370000000 HC RX 637 (ALT 250 FOR IP): Performed by: INTERNAL MEDICINE

## 2020-11-27 PROCEDURE — 80048 BASIC METABOLIC PNL TOTAL CA: CPT

## 2020-11-27 PROCEDURE — 85027 COMPLETE CBC AUTOMATED: CPT

## 2020-11-27 PROCEDURE — 6360000002 HC RX W HCPCS: Performed by: INTERNAL MEDICINE

## 2020-11-27 PROCEDURE — 36415 COLL VENOUS BLD VENIPUNCTURE: CPT

## 2020-11-27 PROCEDURE — 1200000000 HC SEMI PRIVATE

## 2020-11-27 PROCEDURE — 99223 1ST HOSP IP/OBS HIGH 75: CPT | Performed by: INTERNAL MEDICINE

## 2020-11-27 PROCEDURE — 2580000003 HC RX 258: Performed by: INTERNAL MEDICINE

## 2020-11-27 PROCEDURE — C9113 INJ PANTOPRAZOLE SODIUM, VIA: HCPCS | Performed by: INTERNAL MEDICINE

## 2020-11-27 PROCEDURE — A9502 TC99M TETROFOSMIN: HCPCS | Performed by: INTERNAL MEDICINE

## 2020-11-27 PROCEDURE — 3430000000 HC RX DIAGNOSTIC RADIOPHARMACEUTICAL: Performed by: INTERNAL MEDICINE

## 2020-11-27 PROCEDURE — 93005 ELECTROCARDIOGRAM TRACING: CPT | Performed by: INTERNAL MEDICINE

## 2020-11-27 PROCEDURE — 85018 HEMOGLOBIN: CPT

## 2020-11-27 PROCEDURE — 93017 CV STRESS TEST TRACING ONLY: CPT

## 2020-11-27 RX ORDER — SODIUM CHLORIDE 0.9 % (FLUSH) 0.9 %
10 SYRINGE (ML) INJECTION 2 TIMES DAILY
Status: DISCONTINUED | OUTPATIENT
Start: 2020-11-27 | End: 2020-11-28

## 2020-11-27 RX ORDER — METOPROLOL SUCCINATE 25 MG/1
25 TABLET, EXTENDED RELEASE ORAL DAILY
Status: DISCONTINUED | OUTPATIENT
Start: 2020-11-27 | End: 2020-11-29 | Stop reason: HOSPADM

## 2020-11-27 RX ORDER — ATORVASTATIN CALCIUM 10 MG/1
10 TABLET, FILM COATED ORAL NIGHTLY
Status: DISCONTINUED | OUTPATIENT
Start: 2020-11-27 | End: 2020-11-29 | Stop reason: HOSPADM

## 2020-11-27 RX ORDER — PANTOPRAZOLE SODIUM 40 MG/10ML
40 INJECTION, POWDER, LYOPHILIZED, FOR SOLUTION INTRAVENOUS 2 TIMES DAILY
Status: DISCONTINUED | OUTPATIENT
Start: 2020-11-27 | End: 2020-11-29 | Stop reason: HOSPADM

## 2020-11-27 RX ADMIN — DOCUSATE SODIUM 50 MG AND SENNOSIDES 8.6 MG 1 TABLET: 8.6; 5 TABLET, FILM COATED ORAL at 20:13

## 2020-11-27 RX ADMIN — PANTOPRAZOLE SODIUM 40 MG: 40 INJECTION, POWDER, FOR SOLUTION INTRAVENOUS at 20:13

## 2020-11-27 RX ADMIN — REGADENOSON 0.4 MG: 0.08 INJECTION, SOLUTION INTRAVENOUS at 09:22

## 2020-11-27 RX ADMIN — Medication 10 ML: at 09:22

## 2020-11-27 RX ADMIN — PANTOPRAZOLE SODIUM 8 MG/HR: 40 INJECTION, POWDER, FOR SOLUTION INTRAVENOUS at 07:26

## 2020-11-27 RX ADMIN — Medication 10 ML: at 08:06

## 2020-11-27 RX ADMIN — ATORVASTATIN CALCIUM 10 MG: 10 TABLET, FILM COATED ORAL at 20:13

## 2020-11-27 RX ADMIN — TETROFOSMIN 11.8 MILLICURIE: 1.38 INJECTION, POWDER, LYOPHILIZED, FOR SOLUTION INTRAVENOUS at 08:06

## 2020-11-27 RX ADMIN — METOPROLOL SUCCINATE 25 MG: 25 TABLET, FILM COATED, EXTENDED RELEASE ORAL at 15:36

## 2020-11-27 RX ADMIN — IRON SUCROSE 200 MG: 20 INJECTION, SOLUTION INTRAVENOUS at 13:30

## 2020-11-27 RX ADMIN — TETROFOSMIN 35.5 MILLICURIE: 1.38 INJECTION, POWDER, LYOPHILIZED, FOR SOLUTION INTRAVENOUS at 09:22

## 2020-11-27 ASSESSMENT — PAIN SCALES - GENERAL
PAINLEVEL_OUTOF10: 0

## 2020-11-27 NOTE — PLAN OF CARE
Problem: Pain:  Description: Pain management should include both nonpharmacologic and pharmacologic interventions. Goal: Control of acute pain  Description: Control of acute pain  Outcome: Ongoing  Note: Patient resting comfortably in bed at this time, denying pain. Patient instructed to notify this RN if he begins experiencing pain, patient verbalizes understanding.

## 2020-11-27 NOTE — ANESTHESIA PRE PROCEDURE
Department of Anesthesiology  Preprocedure Note       Name:  Pepe Austin   Age:  65 y.o.  :  1950                                          MRN:  [id number]         Date:  2020      Surgeon: Robin Navaor):  Ethyl Soulier, MD    Procedure: Procedure(s):  EGD DIAGNOSTIC ONLY    Medications prior to admission:   Prior to Admission medications    Medication Sig Start Date End Date Taking?  Authorizing Provider   metFORMIN (GLUCOPHAGE) 1000 MG tablet Take 500 mg by mouth 2 times daily (with meals)   Yes Historical Provider, MD   linagliptin (TRADJENTA) 5 MG tablet Take 5 mg by mouth daily   Yes Historical Provider, MD   meloxicam (MOBIC) 7.5 MG tablet Take 7.5 mg by mouth daily   Yes Historical Provider, MD   glipiZIDE (GLUCOTROL) 5 MG tablet Take 5 mg by mouth 2 times daily (before meals)   Yes Historical Provider, MD   methocarbamol (ROBAXIN) 750 MG tablet Take 750 mg by mouth 4 times daily   Yes Historical Provider, MD   aspirin 81 MG EC tablet Take 81 mg by mouth daily    Historical Provider, MD       Current medications:    Current Facility-Administered Medications   Medication Dose Route Frequency Provider Last Rate Last Dose    sodium chloride flush 0.9 % injection 10 mL  10 mL Intravenous BID Bibi Abreu MD        regadenoson [facility]) injection 0.4 mg  0.4 mg Intravenous ONCE PRN Bibi Abreu MD        technetium tetrofosmin (Tc-MYOVIEW) injection 30 millicurie  30 millicurie Intravenous ONCE PRN Bibi Abreu MD        pantoprazole (PROTONIX) 80 mg in sodium chloride 0.9 % 100 mL infusion  8 mg/hr Intravenous Continuous Bibi Abreu MD 10 mL/hr at 20 0726 8 mg/hr at 20 0726    glucose (GLUTOSE) 40 % oral gel 15 g  15 g Oral PRN Evelyn Haskins MD        dextrose 50 % IV solution  12.5 g Intravenous PRN Evelyn Haskins MD        glucagon (rDNA) injection 1 mg  1 mg Intramuscular PRN Evelyn Haskins MD        dextrose 5 % solution  100 mL/hr Intravenous PRN Dakota Sanches Darcie Kincaid MD        insulin lispro (1 Unit Dial) 13 Units  0.08 Units/kg Subcutaneous TID  Lindsay Willis MD        insulin lispro (1 Unit Dial) 0-12 Units  0-12 Units Subcutaneous TID  Lindsay Willis MD        insulin lispro (1 Unit Dial) 0-6 Units  0-6 Units Subcutaneous Nightly Lindsay Willis MD   1 Units at 11/25/20 2250    morphine (PF) injection 2 mg  2 mg Intravenous Q2H PRN Lindsay Willis MD        lactated ringers infusion   Intravenous Continuous Lindsay Willis  mL/hr at 11/26/20 0900      sodium chloride flush 0.9 % injection 10 mL  10 mL Intravenous 2 times per day Lindsay Willis MD   10 mL at 11/27/20 0806    sodium chloride flush 0.9 % injection 10 mL  10 mL Intravenous PRN Lindsay Willis MD        acetaminophen (TYLENOL) tablet 650 mg  650 mg Oral Q6H PRN Lindsay Willis MD        Or   Lu acetaminophen (TYLENOL) suppository 650 mg  650 mg Rectal Q6H PRN Lindsay Willis MD        sennosides-docusate sodium (SENOKOT-S) 8.6-50 MG tablet 1 tablet  1 tablet Oral BID Lindsay Willis MD   1 tablet at 11/26/20 2033    magnesium hydroxide (MILK OF MAGNESIA) 400 MG/5ML suspension 30 mL  30 mL Oral Daily PRN Lindsay Willis MD        promethazine (PHENERGAN) tablet 12.5 mg  12.5 mg Oral Q6H PRN Lindsay Willis MD        Or    ondansetron [facility]) injection 4 mg  4 mg Intravenous Q6H PRN Lindsay Willis MD           Allergies:  No Known Allergies    Problem List:    Patient Active Problem List   Diagnosis Code    Acute GI bleeding K92.2       Past Medical History:        Diagnosis Date    Diabetes mellitus ([facility] [address]        Past Surgical History:        Procedure Laterality Date    CHOLECYSTECTOMY         Social History:    Social History     Tobacco Use    Smoking status: Never Smoker    Smokeless tobacco: Never Used   Substance Use Topics    Alcohol use: Not Currently                                Counseling given: Not Answered      Vital Signs (Current):   Vitals:    11/26/20 1557 11/26/20 2032 11/26/20 2333 11/27/20 0429   BP: 106/71 114/87 125/84 106/68   Pulse: 69 64 60 71   Resp: 16 19 16 15   Temp: 98 °F (36.7 °C) 98.2 °F (36.8 °C) 97.7 °F (36.5 °C) 98.1 °F (36.7 °C)   TempSrc: Oral Oral Oral Oral   SpO2: 99% 95% 94% 91%   Weight:    (!) 330 lb (149.7 kg)   Height:                                                  BP Readings from Last 3 Encounters:   11/27/20 106/68       NPO Status:                                                                                 BMI:   Wt Readings from Last 3 Encounters:   11/27/20 (!) 330 lb (149.7 kg)     Body mass index is 41.25 kg/m².     CBC:   Lab Results   Component Value Date    WBC 6.8 11/27/2020    RBC 4.16 11/27/2020    HGB 10.5 11/27/2020    HCT 33.0 11/27/2020    MCV 79.3 11/27/2020    RDW 17.3 11/27/2020     11/27/2020       CMP:   Lab Results   Component Value Date     11/27/2020    K 4.4 11/27/2020    CL 99 11/27/2020    CO2 30 11/27/2020    BUN 10 11/27/2020    CREATININE 0.9 11/27/2020    GFRAA >60 11/27/2020    LABGLOM >60 11/27/2020    GLUCOSE 87 11/27/2020    PROT 8.2 11/25/2020    CALCIUM 8.9 11/27/2020    BILITOT 0.4 11/25/2020    ALKPHOS 64 11/25/2020    AST 22 11/25/2020    ALT 15 11/25/2020       POC Tests:   Recent Labs     11/27/20  0711   POCGLU 93       Coags: No results found for: PROTIME, INR, APTT    HCG (If Applicable): No results found for: PREGTESTUR, PREGSERUM, HCG, HCGQUANT     ABGs: No results found for: PHART, PO2ART, ABI5SAZ, QIN1OLH, BEART, S6YOONNH     Type & Screen (If Applicable):  No results found for: LABABO, LABRH    Drug/Infectious Status (If Applicable):  No results found for: HIV, HEPCAB    COVID-19 Screening (If Applicable): No results found for: COVID19      Anesthesia Evaluation  Patient summary reviewed and Nursing notes reviewed  Airway: Mallampati: III  TM distance: >3 FB   Neck ROM: full  Mouth opening: > = 3 FB Dental: normal exam   (+) upper dentures and partials      Pulmonary:Negative Pulmonary ROS and normal exam  breath sounds clear to auscultation            Patient did not smoke on day of surgery. Cardiovascular:Negative CV ROS  Exercise tolerance: no interval change,           Rhythm: regular  Rate: normal                    Neuro/Psych:   Negative Neuro/Psych ROS              GI/Hepatic/Renal: Neg GI/Hepatic/Renal ROS            Endo/Other:    (+) DiabetesType II DM, well controlled, , .                 Abdominal:   (+) obese,     Abdomen: soft. Vascular: negative vascular ROS. Anesthesia Plan      MAC     ASA 3       Induction: intravenous. MIPS: Postoperative opioids intended and Prophylactic antiemetics administered. Anesthetic plan and risks discussed with patient. Use of blood products discussed with patient whom consented to blood products. Plan discussed with attending and CRNA.     Attending anesthesiologist reviewed and agrees with Pre Eval content              Ganesh Mendoza DO   11/27/2020

## 2020-11-27 NOTE — CONSULTS
[facility]   Cardiac Electrophysiology Consultation   Date: 11/27/2020  Admit Date:  11/25/2020  Reason for Consultation: Abnormal EKG and chest pain  Consult Requesting Physician: Loraine Khan MD     Chief Complaint   Patient presents with    Abdominal Pain    Emesis     HPI: Vijay Jung is a 65 y.o. morbidly obese gentleman with a past medical history significant for type 2 diabetes mellitus, EtOH use was admitted to the hospital secondary to abdominal discomfort and vomiting. He was noted to have anemia. On review of system, he complained about exertional chest discomfort. In addition to this, his EKG was irregular and abnormal.  Hence cardiology was consulted for further evaluation. Patient endorses exertional chest discomfort and the precordial region going on for the past few months. Currently, he is chest pain-free. He denies any symptoms of shortness of breath, palpitations, lightheadedness or syncopal episodes. Past Medical History:   Diagnosis Date    Diabetes mellitus ([address]         Past Surgical History:   Procedure Laterality Date    CHOLECYSTECTOMY         No Known Allergies    Social History:  Reviewed. reports that he has never smoked. He has never used smokeless tobacco. He reports previous alcohol use. He reports previous drug use. Family History:  Reviewed. family history is not on file. No premature CAD. Review of System:  All other systems reviewed except for that noted above.  Pertinent negatives and positives are:     Objective      · General: negative for fever, chills   · Ophthalmic ROS: negative for - eye pain or loss of vision  · ENT ROS: negative for - headaches, sore throat   · Respiratory: negative for - cough, sputum  · Cardiovascular: Reviewed in HPI  · Gastrointestinal: negative for - abdominal pain, diarrhea, N/V  · Hematology: negative for - bleeding, blood clots, bruising or jaundice  · Genito-Urinary:  negative for - Dysuria or incontinence  · Musculoskeletal: negative for - Joint swelling, muscle pain  · Neurological: negative for - confusion, dizziness, headaches   · Psychiatric: No anxiety, no depression. · Dermatological: negative for - rash    Physical Examination:  Vitals:    20 1121   BP: 130/77   Pulse: 77   Resp: 16   Temp: 97.8 °F (36.6 °C)   SpO2: 97%        Intake/Output Summary (Last 24 hours) at 2020 1507  Last data filed at 2020 7730  Gross per 24 hour   Intake 1577 ml   Output 500 ml   Net 1077 ml     In: 1577 [I.V.:1577]  Out: 500    Wt Readings from Last 3 Encounters:   20 (!) 330 lb (149.7 kg)     Temp  Av °F (36.7 °C)  Min: 97.7 °F (36.5 °C)  Max: 98.2 °F (36.8 °C)  Pulse  Av.2  Min: 60  Max: 77  BP  Min: 106/68  Max: 130/77  SpO2  Av.2 %  Min: 91 %  Max: 99 %    · Telemetry: Sinus rhythm, second-degree AV block, Mobitz type I  · Constitutional: Alert. Oriented to person, place, and time. No distress. · Head: Normocephalic and atraumatic. · Mouth/Throat: Lips appear moist. Oropharynx is clear and moist.  · Eyes: Conjunctivae normal. EOM are normal.   · Neck: Neck supple. No lymphadenopathy. No rigidity. No JVD present. · Cardiovascular: Normal rate, regular rhythm. Normal S1&S2. Carotid pulse 2+ bilaterally. · Pulmonary/Chest: Bilateral respiratory sounds present. No respiratory accessory muscle use. No wheezes, No rhonchi. · Abdominal: Soft. Normal bowel sounds present. No distension, No tenderness. No splenomegaly. No hernia. · Musculoskeletal: No tenderness. No edema    · Lymphadenopathy: Has no cervical adenopathy. · Neurological: Alert and oriented. Cranial nerve II-XII grossly intact, No gross deficit to touch. · Skin: Skin is warm and dry. No rash, lesions, ulcerations noted. · Psychiatric: No anxiety nor agitation. Labs:  Reviewed.    Recent Labs     20  1405 20  0621 20  0644   * 134* 134*   K 4.4 4.2 4.4   CL 95* 98* 99   CO2 26 27 30   BUN 15 13 10   CREATININE 1.0 0.8 0.9     Recent Labs     11/25/20  1832 11/26/20  0621 11/26/20  2130 11/27/20  0645   WBC 11.9* 9.7  --  6.8   HGB 11.0* 10.3* 9.8* 10.5*   HCT 34.8* 31.6* 30.6* 33.0*   MCV 79.9* 78.1*  --  79.3*    165  --  161     Lab Results   Component Value Date    TROPONINI <0.01 11/26/2020     No results found for: BNP  No results found for: PROTIME, INR  No results found for: CHOL, HDL, TRIG    Diagnostic and imaging results reviewed. ECG: Sinus rhythm, Mobitz type I    I independently reviewed the ECG and telemetry. Scheduled Meds:   sodium chloride flush  10 mL Intravenous BID    iron sucrose  200 mg Intravenous Q24H    pantoprazole  40 mg Intravenous BID    insulin lispro  0.08 Units/kg Subcutaneous TID WC    insulin lispro  0-12 Units Subcutaneous TID WC    insulin lispro  0-6 Units Subcutaneous Nightly    sodium chloride flush  10 mL Intravenous 2 times per day    sennosides-docusate sodium  1 tablet Oral BID     Continuous Infusions:   dextrose      lactated ringers 100 mL/hr at 11/26/20 0900     PRN Meds:.glucose, dextrose, glucagon (rDNA), dextrose, morphine, sodium chloride flush, acetaminophen **OR** acetaminophen, magnesium hydroxide, promethazine **OR** ondansetron     Assessment:   Patient Active Problem List    Diagnosis Date Noted    Acute GI bleeding 11/25/2020      Active Hospital Problems    Diagnosis Date Noted    Acute GI bleeding [K92.2] 11/25/2020     Assessment and plan  1. Secondary AV block, Mobitz type I  2. Exertional chest discomfort  3. Type 2 diabetes mellitus    His telemetry showed Mobitz type I AV block. He is symptomatic as far as his heart rhythm is concerned. I personally reviewed his [place]. His [place] is concerning for possible LAD territory ischemia. I discussed with Dr. Ceferino Colindres who will probably proceed with cardiac catheterization tomorrow after his evaluation for bleeding has been completed. Meanwhile, I will start him on small dose of beta-blocker and statins. Secondary to concerns of GI bleed, he is not on antiplatelets. Thank you for allowing me to participate in the care of Silvia Mariscal . If you have any questions/comments, please do not hesitate to contact us. Bebeto Hartman MD   Cardiac Electrophysiology  [facility]    For any EP related issues after 5 PM, contact [facility] on call cardiology through .

## 2020-11-27 NOTE — PROGRESS NOTES
Physical Therapy and Occupational Therapy  Discharge    Order received and chart reviewed. Pt currently off floor for stress test.  Pt also awaiting and EGD test today. Spoke with RN who reports pt is up ad kandice with steady gait and managing his ADLs without difficulty. PT/OT evaluations not indicated. Will sign off and defer ongoing activity to RN staff. RN made aware.     Mateo Anguiano, OTR/L, [id number]

## 2020-11-27 NOTE — PROGRESS NOTES
Pt is aware of plan of care to be NPO after MN tonight for AM EGD followed by afternoon angiogram per Dr. Blanchie Mcburney.

## 2020-11-27 NOTE — CONSULTS
GI Consult Note      Admission Date: 11/25/2020  Hospital Day: Hospital Day: 3  Attending: Tri Yates MD  Date of service: 11/27/20    Subjective:     Chief complaint/ Reason for consult:   GI bleeding       HPI: Marc Styles is a 65 y.o.  male patient, who was seen at the request of Dr. Tri Yates MD.    History was obtained from chart review and the patient. 66-year-old male with history of alcoholism presents with complaints of nausea/vomiting, abdominal pain, melena. Pt states that he developed epigastric abdominal pain over the past week and which was mild and perhaps exacerbated by eating. Pain did not radiate anywhere else. On 11/24 he had 1 dark stool but he admits he did consume Pepto-Bismol that day. He is not sure if he consumed Pepto-Bismol before or after the dark stool. He denies any hematochezia. He had 2 episodes of vomiting and states that he had dark material in the emesis. He has had no further episodes. He denies any weight loss, dysphagia, GERD. He has been taking daily NSAIDs. He also describes left-sided chest pain and underwent a stress test this morning. 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 He has never used smokeless tobacco.  · Alcohol use:   reports previous alcohol use. · Currently lives in: [facility]  ·  reports previous drug use. Family History:   History reviewed. No pertinent family history.         Medications:    sodium chloride flush  10 mL Intravenous BID    insulin lispro 0.08 Units/kg Subcutaneous TID WC    insulin lispro  0-12 Units Subcutaneous TID WC    insulin lispro  0-6 Units Subcutaneous Nightly    sodium chloride flush  10 mL Intravenous 2 times per day    sennosides-docusate sodium  1 tablet Oral BID            REVIEW OF SYSTEMS:       Pertinent items are noted in HPI. Objective:   PHYSICAL EXAM:      Vitals:   Vitals:    11/26/20 1557 11/26/20 2032 11/26/20 2333 11/27/20 0429   BP: 106/71 114/87 125/84 106/68   Pulse: 69 64 60 71   Resp: 16 19 16 15   Temp: 98 °F (36.7 °C) 98.2 °F (36.8 °C) 97.7 °F (36.5 °C) 98.1 °F (36.7 °C)   TempSrc: Oral Oral Oral Oral   SpO2: 99% 95% 94% 91%   Weight:    (!) 330 lb (149.7 kg)   Height:           General appearance: alert, cooperative, no distress, appears stated age  Eyes: Anicteric  Head: Normocephalic, without obvious abnormality  Lungs: clear to auscultation bilaterally, Normal Effort  Heart: regular rate and rhythm, normal S1 and S2, no murmurs or rubs  Abdomen: soft, non-tender. Bowel sounds normal. No masses,  no organomegaly. Extremities: atraumatic, no cyanosis or edema  Skin: warm and dry, no jaundice  Neuro: Grossly intact, A&OX3    Intake and output:   I/O last 3 completed shifts:   In: 5575 [P.O.:480; I.V.:2377]  Out: 1300 [Urine:1300]    Lab Data:      CBC:   Recent Labs     11/25/20  1832 11/26/20  0621 11/26/20  2130 11/27/20  0645   WBC 11.9* 9.7  --  6.8   RBC 4.36 4.05*  --  4.16*   HGB 11.0* 10.3* 9.8* 10.5*   HCT 34.8* 31.6* 30.6* 33.0*    165  --  161   MCV 79.9* 78.1*  --  79.3*   MCH 25.2* 25.3*  --  25.3*   MCHC 31.6 32.4  --  31.9   RDW 17.1* 17.2*  --  17.3*        BMP:  Recent Labs     11/25/20  1405 11/26/20  0621 11/27/20  0644   * 134* 134*   K 4.4 4.2 4.4   CL 95* 98* 99   CO2 26 27 30   BUN 15 13 10   CREATININE 1.0 0.8 0.9   CALCIUM 9.6 9.0 8.9   GLUCOSE 115* 74 87        Hepatic Function Panel:   Recent Labs     11/25/20  1405   AST 22   ALT 15   BILIDIR <0.2   BILITOT 0.4 ALKPHOS 64       Recent Labs     11/25/20  1405   LIPASE 24.0     No results for input(s): PROTIME, INR in the last 72 hours. No results for input(s): PTT in the last 72 hours. No results for input(s): OCCULTBLD in the last 72 hours. Imaging:    CT ABDOMEN PELVIS W IV CONTRAST Additional Contrast? None   Final Result      1. No acute abnormality in the abdomen and pelvis. XR ACUTE ABD SERIES CHEST 1 VW   Final Result      NM MYOCARDIAL SPECT REST EXERCISE OR RX    (Results Pending)         Known drug Allergies:   No Known Allergies        Assessment:   The patient is a 65 y.o. old male  with following problems:    1. Melena   2. Epigastric abdominal pain   3. Nausea/vomiting   4. High dose NSAID use   5. EtOH dependence   6. Negative colonoscopy 8/2020 with outside provider   7. Iron deficiency anemia with hgb 10.5 and MCV 79 which indicates some degree of chronic anemia as well. Likely acute on chronic anemia due to GI blood loss    Recommendations:   1. EGD--EGD was planned for this morning but patient was taken a stress test and the endoscopy had to be postponed. We notified the stress lab that we would transport the patient directly to endoscopy after they finish the stress test.  Unfortunately, the stress lab staff gave the patient a full cup of hot chocolate after his test was completed and now he is not a candidate to receive anesthesia for his procedure. He will be put on clear liquid diet and plan for upper endoscopy first case on 11/28. 2. PPI bid   3. NPO p MN tonight.             Kenny Jack MD   [facility]

## 2020-11-27 NOTE — CARE COORDINATION
Referred to patient for d/c planning. Spoke to patient. Patient is a 65year old male admitted for GI bleed. Patient reports he lives at home with wife. Patient reports he is independent in ADLs. Patient denies d/c needs at this time. Case Management Assessment           Initial Evaluation                Date / Time of Evaluation: 11/27/2020 3:27 PM                 Assessment Completed by: Vinicio Arboleda    Patient Name: Anayeli Avendano     YOB: 1950  Diagnosis: Acute GI bleeding [K92.2]  Acute GI bleeding [K92.2]     Date / Time: 11/25/2020  1:17 PM    Patient Admission Status: Inpatient    If patient is discharged prior to next notation, then this note serves as note for discharge by case management. Current PCP: No primary care provider on file. Clinic Patient: No    Chart Reviewed: Yes  Patient/ Family Interviewed: Yes    Initial assessment completed at bedside with: Patient    Hospitalization in the last 30 days: No    Emergency Contacts:  Extended Emergency Contact Information  Primary Emergency Contact: gloria francis  Address: [address] Phone: [phone]  Mobile Phone: [phone]  Relation: Spouse  Preferred language: English   needed? No    Advance Directives:   Code Status: Full Code    Healthcare Power of : No    Financial  Payor: Rocio Powell / Plan: Terell Mcpherson ESSENTIAL/PLUS / Product Type: *No Product type* /     Pre-cert required for SNF: Yes    Pharmacy    [address] [address], [address]  [address] [place] [id number]  Phone: [phone] Fax: [phone]      Potential assistance Purchasing Medications: Potential Assistance Purchasing Medications: No  Does Patient want to participate in local refill/ meds to beds program?: No    Meds To Beds General Rules:  1.  Can ONLY be done Monday- Friday between 8:30am-5pm  2. Prescription(s) must be in pharmacy by 3pm to be filled same day  3. Copy of patient's insurance/ prescription drug card and patient face sheet must be sent along with the prescription(s)  4. Cost of Rx cannot be added to hospital bill. If financial assistance is needed, please contact unit  or ;  or  CANNOT provide pharmacy voucher for patients co-pays  5.  Patients can then  the prescription on their way out of the hospital at discharge, or pharmacy can deliver to the bedside if staff is available. (payment due at time of pick-up or delivery - cash, check, or card accepted)     Able to afford home medications/ co-pay costs: Yes    ADLS  Support Systems: Spouse/Significant Other    PT AM-PAC:   /24  OT AM-PAC:   /24    [place]: home with wife  Steps:     Plans to RETURN to current housing: Yes  Barriers to RETURNING to current housing: medical complications    Home Care Information  Currently ACTIVE with [email or web address]: No  Home Care Agency: Not Applicable    Currently ACTIVE with [place] [facility]: No        Durable Medical Equipment  DME Provider:   Equipment: none noted    Home Oxygen and Respiratory Equipment  Has HOME OXYGEN prior to admission: No  [address]: Not Applicable      DISCHARGE PLAN:  Disposition: Home- No Services Needed    Transportation PLAN for discharge: family     Factors facilitating achievement of predicted outcomes: Family support, Motivated, Cooperative and Pleasant    Barriers to discharge: Medical complications    Additional Case Management Notes: see above    The Plan for Transition of Care is related to the following treatment goals of Acute GI bleeding [K92.2]  Acute GI bleeding [K92.2]    The Patient and/or patient representative Mirta Cotton and his family were provided with a choice of provider and agrees with the discharge plan Not Indicated    Freedom of choice list was provided with basic dialogue that supports the patient's individualized plan of care/goals and shares the quality data associated with the providers.  Not Indicated    Care Transition patient: No    FELIPA Menezes, MICKEY-S  [facility]  Case Management Department  Ph: [phone]

## 2020-11-27 NOTE — CONSULTS
[address]         Reason for Consultation/Chief Complaint: \"I have been having black stools. .\"       History of Present Illness:  Leeanna Ruelas is a 65 y.o. patient who presented to the hospital with complaints of black stools and occasional chest pain exertional and at rest   HB 10.5 and troponin negative x 2. Plan for EGD tomorrow. Past Medical History:   has a past medical history of Diabetes mellitus ([address] Surgical History:   has a past surgical history that includes Cholecystectomy. Social History:   reports that he has never smoked. He has never used smokeless tobacco. He reports previous alcohol use. He reports previous drug use. Family History:  No evidence for sudden cardiac death or premature CAD    Home Medications:  Were reviewed and are listed in nursing record. and/or listed below  Prior to Admission medications    Medication Sig Start Date End Date Taking?  Authorizing Provider   metFORMIN (GLUCOPHAGE) 1000 MG tablet Take 500 mg by mouth 2 times daily (with meals)   Yes Historical Provider, MD   linagliptin (TRADJENTA) 5 MG tablet Take 5 mg by mouth daily   Yes Historical Provider, MD   meloxicam (MOBIC) 7.5 MG tablet Take 7.5 mg by mouth daily   Yes Historical Provider, MD   glipiZIDE (GLUCOTROL) 5 MG tablet Take 5 mg by mouth 2 times daily (before meals)   Yes Historical Provider, MD   methocarbamol (ROBAXIN) 750 MG tablet Take 750 mg by mouth 4 times daily   Yes Historical Provider, MD   aspirin 81 MG EC tablet Take 81 mg by mouth daily    Historical Provider, MD        Hospital Medications:   sodium chloride flush  10 mL Intravenous BID    iron sucrose  200 mg Intravenous Q24H    pantoprazole  40 mg Intravenous BID    metoprolol succinate  25 mg Oral Daily    atorvastatin  10 mg Oral Nightly    insulin lispro  0.08 Units/kg Subcutaneous TID WC    insulin lispro  0-12 Units Subcutaneous TID WC    insulin lispro  0-6 Units Subcutaneous Nightly    sodium chloride flush  10 mL Intravenous 2 times per day    sennosides-docusate sodium  1 tablet Oral BID     glucose, dextrose, glucagon (rDNA), dextrose, morphine, sodium chloride flush, acetaminophen **OR** acetaminophen, magnesium hydroxide, promethazine **OR** ondansetron   dextrose      lactated ringers 75 mL/hr at 11/27/20 1531       Allergies:  Patient has no known allergies. Review of Systems:     A 14 ROS obtained and negative except as mentioned in HPI. · Constitutional: there has been no unanticipated weight loss. · Eyes: No visual changes or diplopia. No scleral icterus. · ENT: No Headaches, hearing loss or vertigo. No mouth sores or sore throat. · Cardiovascular: No loss of consciousness. No hemoptysis, pleuritic pain, or phlebitis. · Respiratory: No cough or wheezing, no sputum production. No hematemesis. · Gastrointestinal: No abdominal pain, appetite loss, blood in stools. No change in bowel or bladder habits. · Genitourinary: No dysuria, or hematuria. · Musculoskeletal:  No gait disturbance, weakness or joint complaints. · Integumentary: No rash or pruritis. · Neurological: No headache, diplopia,numbness or tingling. No change in gait, balance, coordination, mood, affect, memory, mentation, behavior.   · Psychiatric: No anxiety,  · Endocrine: No malaise,  · Hematologic/Lymphatic: No abnormal bruising  · Allergic/Immunologic: No nasal congestion      Physical Examination:    Vitals:    11/27/20 1531   BP: 125/79   Pulse: 71   Resp: 16   Temp: 98.4 °F (36.9 °C)   SpO2: 98%    Weight: (!) 330 lb (149.7 kg)         General Appearance:  Alert, cooperative, no distress, appears stated age   Head:  Normocephalic, without obvious abnormality, atraumatic   Eyes:  PERRL   Nose: Nares normal,   Neck: Supple, JVP normal   Lungs:   Clear to auscultation bilaterally, respirations unlabored   Chest Wall:  No tenderness or deformity   Heart:  Regular rate and rhythm, normal S1, S2 normal, no murmur, II/VI HSM No rub. No S3 / S4 gallop   Abdomen:   Soft, non-tender, +bowel sounds   Extremities: no cyanosis, no clubbing , No  diffuse  edema   Pulses: Symmetric extremities   Skin: no gross lesions or rashes   Pysch: Normal mood and affect   Neurologic: No gross deficits. CN II - XII grossly intact        Labs  CBC:   Lab Results   Component Value Date    WBC 6.8 11/27/2020    RBC 4.16 11/27/2020    HGB 10.5 11/27/2020    HCT 33.0 11/27/2020    MCV 79.3 11/27/2020    RDW 17.3 11/27/2020     11/27/2020     CMP:    Lab Results   Component Value Date     11/27/2020    K 4.4 11/27/2020    CL 99 11/27/2020    CO2 30 11/27/2020    BUN 10 11/27/2020    CREATININE 0.9 11/27/2020    GFRAA >60 11/27/2020    LABGLOM >60 11/27/2020    GLUCOSE 87 11/27/2020    PROT 8.2 11/25/2020    CALCIUM 8.9 11/27/2020    BILITOT 0.4 11/25/2020    ALKPHOS 64 11/25/2020    AST 22 11/25/2020    ALT 15 11/25/2020     PT/INR:  No results found for: PTINR  Recent Labs     11/26/20  1206 11/26/20  1729   TROPONINI <0.01 <0.01       EKG: SR with NSST/T wave changes    Assessment  Patient Active Problem List   Diagnosis    Acute GI bleeding        Impression:  GI bleeding. Abnormal nuclear cardiac stress test.  Anemia. Recommendations:    I had the opportunity to review the clinical symptoms and presentation of Octavia Fontana. I recommend that the patient undergo further cardiac evaluation with cardiac cath after UGI endoscopy. May proceed with EGD colonoscopy from the cardiac standpoint before cardiac cath. If GI bleeding present then can defer angiography. Pt may need platelet inhibitors for possible stent but chest pain is somewhat atypical with a positional component. Thank you for allowing to us to participate in the care or Octavia Fontana. All questions and concerns were addressed to the patient. Alternatives to my treatment were discussed.  The note was completed using

## 2020-11-27 NOTE — PROGRESS NOTES
Hospitalist Progress Note      PCP: No primary care provider on file. Date of Admission: 11/25/2020    Chief Complaint: abd pain, vomiting,    Hospital Course:   65 y.o. male who has PMHx of T2DM, Vitamin D deficiency, DDD Lumbar, who presented to [facility] [place] [place]. with abdominal pain, emesis. Abdominal pain   Location: periumbilical abdominal pain   Onset: several days ago  Quality: sharp. , crampy  Radiation: none  Severity: moderate  Exacerbating - unable to define  Relieving factors: did get some relief with tums and pepto bismol at home  No changes with food intake  Associated factors: black stools x 2 days, NBNB emesis, denied fevers/chills    He has hx of heavy Etoh use, last use 1 yr ago  He is former smoker 0.5 PPD and quit one year back  He has been using Advil 4-6 pills daily for low back pain  He also tells me that he has been having left sided chest pain when he goes up and down the stairs and improves with rest  Last Colonoscopy was done 10/2020- no significant findings, scope results as below  Prior abdominal surgeries: umbilical hernia repair, cholycystectomy    In the ED small bowel obstruction versus other acutely emergent intra-abdominal complaint such as perforated ulcer, peptic ulcer disease with active bleeding given black stools. CT scan here unremarkable, hemoglobin 11.5. Prior hemoglobin from 2016 is 14.     Subjective:  Says pain is better, no complains of nausea  No more loose stools, no chest pain or shortness of breath      Medications:  Reviewed    Infusion Medications    dextrose      lactated ringers 100 mL/hr at 11/26/20 0900     Scheduled Medications    sodium chloride flush  10 mL Intravenous BID    iron sucrose  200 mg Intravenous Q24H    pantoprazole  40 mg Intravenous BID    insulin lispro  0.08 Units/kg Subcutaneous TID WC    insulin lispro  0-12 Units Subcutaneous TID WC    insulin lispro  0-6 Units Subcutaneous Nightly    sodium chloride flush  10 mL Intravenous 2 times per day    sennosides-docusate sodium  1 tablet Oral BID     PRN Meds: glucose, dextrose, glucagon (rDNA), dextrose, morphine, sodium chloride flush, acetaminophen **OR** acetaminophen, magnesium hydroxide, promethazine **OR** ondansetron      Intake/Output Summary (Last 24 hours) at 11/27/2020 1458  Last data filed at 11/27/2020 0658  Gross per 24 hour   Intake 1577 ml   Output 500 ml   Net 1077 ml       Physical Exam Performed:    /77   Pulse 77   Temp 97.8 °F (36.6 °C) (Oral)   Resp 16   Ht 6' 3\" (1.905 m)   Wt (!) 330 lb (149.7 kg)   SpO2 97%   BMI 41.25 kg/m²     General appearance: Morbidly obese male, no apparent distress, appears stated age and cooperative. HEENT: Pupils equal, round, and reactive to light. Conjunctivae/corneas clear. Neck: Supple, with full range of motion. No jugular venous distention. Trachea midline. Respiratory:  Normal respiratory effort. Clear to auscultation, bilaterally without Rales/Wheezes/Rhonchi. Cardiovascular: Regular rate and rhythm with normal S1/S2 without murmurs, rubs or gallops. Abdomen: Soft, non-tender, non-distended with normal bowel sounds. Musculoskeletal: No clubbing, cyanosis or edema bilaterally. Full range of motion without deformity. Skin: Skin color, texture, turgor normal.  No rashes or lesions. Neurologic:  Neurovascularly intact without any focal sensory/motor deficits.  Cranial nerves: II-XII intact, grossly non-focal.  Psychiatric: Alert and oriented, thought content appropriate, normal insight  Capillary Refill: Brisk,< 3 seconds   Peripheral Pulses: +2 palpable, equal bilaterally       Labs:   Recent Labs     11/25/20  1832 11/26/20  0621 11/26/20  2130 11/27/20  0645   WBC 11.9* 9.7  --  6.8   HGB 11.0* 10.3* 9.8* 10.5*   HCT 34.8* 31.6* 30.6* 33.0*    165  --  161     Recent Labs     11/25/20  1405 11/26/20  0621 11/27/20  0644   * 134* 134*   K 4.4 4.2 4.4   CL 95* 98* 99   CO2 26 27 30   BUN 15 13 10   CREATININE 1.0 0.8 0.9   CALCIUM 9.6 9.0 8.9     Recent Labs     11/25/20  1405   AST 22   ALT 15   BILIDIR <0.2   BILITOT 0.4   ALKPHOS 64     No results for input(s): INR in the last 72 hours. Recent Labs     11/26/20  1206 11/26/20  1729   TROPONINI <0.01 <0.01       Urinalysis:      Lab Results   Component Value Date    NITRU Negative 11/25/2020    WBCUA 3-5 11/25/2020    BACTERIA Rare 11/25/2020    RBCUA None seen 11/25/2020    BLOODU Negative 11/25/2020    SPECGRAV 1.020 11/25/2020    GLUCOSEU Negative 11/25/2020       Radiology:  NM MYOCARDIAL SPECT REST EXERCISE OR RX   Final Result      CT ABDOMEN PELVIS W IV CONTRAST Additional Contrast? None   Final Result      1. No acute abnormality in the abdomen and pelvis. XR ACUTE ABD SERIES CHEST 1 VW   Final Result        Colonoscopy 08/2020  #1 minimal scattered diverticulosis. #2 diminutive rectal sessile polyp removed using cold biopsy polypectomy. 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 The patient was then decompressed and the scope was then withdrawn. The patient tolerated the procedure well. There were no immediate complications. IMPRESSION:   Small rectal polyp, old EMR site in the rectum biopsied and diverticulosis  Otherwise normal colonoscopy. RECOMMENDATIONS: Follow-up on biopsy results. Repeat colonoscopy 3 years. Assessment/Plan:    Active Hospital Problems    Diagnosis Date Noted    Acute GI bleeding [K92.2] 11/25/2020     1. Acute Gastrointestinal Bleeding, tarry stools with abdominal pain nausea vomiting, concern for Gastritis, Peptic ulcer disease. CT ruled out perforation. 2. Acute blood loss anemia  3. Chest pain and dyspnea on exertion x few weeks concern for ACS  4. Second Degree Mobitz Type 1 block  5. Type 2 Diabetes w/ Neuropathy, A1c 6/2020 5.8  6. Chronic low back pain due to lumbar DDD  7. Vitamin D Deficiency  8.  Hx of heavy Etoh

## 2020-11-28 ENCOUNTER — ANESTHESIA EVENT (OUTPATIENT)
Dept: ENDOSCOPY | Age: 70
DRG: 378 | End: 2020-11-28
Payer: MEDICARE

## 2020-11-28 ENCOUNTER — ANESTHESIA (OUTPATIENT)
Dept: ENDOSCOPY | Age: 70
DRG: 378 | End: 2020-11-28
Payer: MEDICARE

## 2020-11-28 VITALS — DIASTOLIC BLOOD PRESSURE: 70 MMHG | SYSTOLIC BLOOD PRESSURE: 119 MMHG | OXYGEN SATURATION: 100 %

## 2020-11-28 LAB
ANION GAP SERPL CALCULATED.3IONS-SCNC: 7 MMOL/L (ref 3–16)
BUN BLDV-MCNC: 8 MG/DL (ref 7–20)
CALCIUM SERPL-MCNC: 9.2 MG/DL (ref 8.3–10.6)
CHLORIDE BLD-SCNC: 97 MMOL/L (ref 99–110)
CHOLESTEROL, TOTAL: 121 MG/DL (ref 0–199)
CO2: 29 MMOL/L (ref 21–32)
CREAT SERPL-MCNC: 0.9 MG/DL (ref 0.8–1.3)
EKG ATRIAL RATE: 71 BPM
EKG ATRIAL RATE: 76 BPM
EKG DIAGNOSIS: NORMAL
EKG DIAGNOSIS: NORMAL
EKG P AXIS: 61 DEGREES
EKG P-R INTERVAL: 272 MS
EKG Q-T INTERVAL: 380 MS
EKG Q-T INTERVAL: 404 MS
EKG QRS DURATION: 102 MS
EKG QRS DURATION: 104 MS
EKG QTC CALCULATION (BAZETT): 427 MS
EKG QTC CALCULATION (BAZETT): 439 MS
EKG R AXIS: 56 DEGREES
EKG R AXIS: 68 DEGREES
EKG T AXIS: 25 DEGREES
EKG T AXIS: 50 DEGREES
EKG VENTRICULAR RATE: 71 BPM
EKG VENTRICULAR RATE: 76 BPM
GFR AFRICAN AMERICAN: >60
GFR NON-AFRICAN AMERICAN: >60
GLUCOSE BLD-MCNC: 105 MG/DL (ref 70–99)
GLUCOSE BLD-MCNC: 139 MG/DL (ref 70–99)
GLUCOSE BLD-MCNC: 92 MG/DL (ref 70–99)
HCT VFR BLD CALC: 31.7 % (ref 40.5–52.5)
HCT VFR BLD CALC: 34.1 % (ref 40.5–52.5)
HDLC SERPL-MCNC: 45 MG/DL (ref 40–60)
HEMOGLOBIN: 10.1 G/DL (ref 13.5–17.5)
HEMOGLOBIN: 10.7 G/DL (ref 13.5–17.5)
LDL CHOLESTEROL CALCULATED: 65 MG/DL
LEFT VENTRICULAR EJECTION FRACTION MODE: NORMAL
LV EF: 55 %
MCH RBC QN AUTO: 25 PG (ref 26–34)
MCHC RBC AUTO-ENTMCNC: 31.5 G/DL (ref 31–36)
MCV RBC AUTO: 79.1 FL (ref 80–100)
PDW BLD-RTO: 17.6 % (ref 12.4–15.4)
PERFORMED ON: ABNORMAL
PERFORMED ON: NORMAL
PLATELET # BLD: 182 K/UL (ref 135–450)
PMV BLD AUTO: 8.6 FL (ref 5–10.5)
POTASSIUM REFLEX MAGNESIUM: 4.1 MMOL/L (ref 3.5–5.1)
RBC # BLD: 4.31 M/UL (ref 4.2–5.9)
SODIUM BLD-SCNC: 133 MMOL/L (ref 136–145)
TRIGL SERPL-MCNC: 55 MG/DL (ref 0–150)
VLDLC SERPL CALC-MCNC: 11 MG/DL
WBC # BLD: 7.5 K/UL (ref 4–11)

## 2020-11-28 PROCEDURE — 6370000000 HC RX 637 (ALT 250 FOR IP): Performed by: INTERNAL MEDICINE

## 2020-11-28 PROCEDURE — 3700000000 HC ANESTHESIA ATTENDED CARE: Performed by: INTERNAL MEDICINE

## 2020-11-28 PROCEDURE — 6360000004 HC RX CONTRAST MEDICATION: Performed by: INTERNAL MEDICINE

## 2020-11-28 PROCEDURE — 2580000003 HC RX 258: Performed by: INTERNAL MEDICINE

## 2020-11-28 PROCEDURE — 88312 SPECIAL STAINS GROUP 1: CPT

## 2020-11-28 PROCEDURE — 6360000002 HC RX W HCPCS: Performed by: ANESTHESIOLOGY

## 2020-11-28 PROCEDURE — 88342 IMHCHEM/IMCYTCHM 1ST ANTB: CPT

## 2020-11-28 PROCEDURE — C9113 INJ PANTOPRAZOLE SODIUM, VIA: HCPCS | Performed by: INTERNAL MEDICINE

## 2020-11-28 PROCEDURE — 80048 BASIC METABOLIC PNL TOTAL CA: CPT

## 2020-11-28 PROCEDURE — 6360000002 HC RX W HCPCS: Performed by: INTERNAL MEDICINE

## 2020-11-28 PROCEDURE — 93010 ELECTROCARDIOGRAM REPORT: CPT | Performed by: INTERNAL MEDICINE

## 2020-11-28 PROCEDURE — 1200000000 HC SEMI PRIVATE

## 2020-11-28 PROCEDURE — 3609012400 HC EGD TRANSORAL BIOPSY SINGLE/MULTIPLE: Performed by: INTERNAL MEDICINE

## 2020-11-28 PROCEDURE — 6360000002 HC RX W HCPCS

## 2020-11-28 PROCEDURE — 85027 COMPLETE CBC AUTOMATED: CPT

## 2020-11-28 PROCEDURE — 99152 MOD SED SAME PHYS/QHP 5/>YRS: CPT

## 2020-11-28 PROCEDURE — 2709999900 HC NON-CHARGEABLE SUPPLY

## 2020-11-28 PROCEDURE — 2709999900 HC NON-CHARGEABLE SUPPLY: Performed by: INTERNAL MEDICINE

## 2020-11-28 PROCEDURE — 7100000010 HC PHASE II RECOVERY - FIRST 15 MIN: Performed by: INTERNAL MEDICINE

## 2020-11-28 PROCEDURE — 93458 L HRT ARTERY/VENTRICLE ANGIO: CPT | Performed by: INTERNAL MEDICINE

## 2020-11-28 PROCEDURE — 85018 HEMOGLOBIN: CPT

## 2020-11-28 PROCEDURE — 93458 L HRT ARTERY/VENTRICLE ANGIO: CPT

## 2020-11-28 PROCEDURE — 80061 LIPID PANEL: CPT

## 2020-11-28 PROCEDURE — 6370000000 HC RX 637 (ALT 250 FOR IP)

## 2020-11-28 PROCEDURE — C1769 GUIDE WIRE: HCPCS

## 2020-11-28 PROCEDURE — 36415 COLL VENOUS BLD VENIPUNCTURE: CPT

## 2020-11-28 PROCEDURE — 2500000003 HC RX 250 WO HCPCS

## 2020-11-28 PROCEDURE — 3700000001 HC ADD 15 MINUTES (ANESTHESIA): Performed by: INTERNAL MEDICINE

## 2020-11-28 PROCEDURE — 99153 MOD SED SAME PHYS/QHP EA: CPT

## 2020-11-28 PROCEDURE — 85014 HEMATOCRIT: CPT

## 2020-11-28 PROCEDURE — 4A023N8 MEASUREMENT OF CARDIAC SAMPLING AND PRESSURE, BILATERAL, PERCUTANEOUS APPROACH: ICD-10-PCS | Performed by: INTERNAL MEDICINE

## 2020-11-28 PROCEDURE — 7100000011 HC PHASE II RECOVERY - ADDTL 15 MIN: Performed by: INTERNAL MEDICINE

## 2020-11-28 PROCEDURE — C1894 INTRO/SHEATH, NON-LASER: HCPCS

## 2020-11-28 PROCEDURE — B2111ZZ FLUOROSCOPY OF MULTIPLE CORONARY ARTERIES USING LOW OSMOLAR CONTRAST: ICD-10-PCS | Performed by: INTERNAL MEDICINE

## 2020-11-28 PROCEDURE — 0DB78ZX EXCISION OF STOMACH, PYLORUS, VIA NATURAL OR ARTIFICIAL OPENING ENDOSCOPIC, DIAGNOSTIC: ICD-10-PCS | Performed by: INTERNAL MEDICINE

## 2020-11-28 PROCEDURE — 88305 TISSUE EXAM BY PATHOLOGIST: CPT

## 2020-11-28 RX ORDER — SODIUM CHLORIDE 9 MG/ML
INJECTION, SOLUTION INTRAVENOUS CONTINUOUS
Status: ACTIVE | OUTPATIENT
Start: 2020-11-28 | End: 2020-11-28

## 2020-11-28 RX ORDER — ATROPINE SULFATE 0.4 MG/ML
0.5 AMPUL (ML) INJECTION
Status: DISPENSED | OUTPATIENT
Start: 2020-11-28 | End: 2020-11-28

## 2020-11-28 RX ORDER — PROPOFOL 10 MG/ML
INJECTION, EMULSION INTRAVENOUS PRN
Status: DISCONTINUED | OUTPATIENT
Start: 2020-11-28 | End: 2020-11-28 | Stop reason: SDUPTHER

## 2020-11-28 RX ORDER — ONDANSETRON 2 MG/ML
4 INJECTION INTRAMUSCULAR; INTRAVENOUS EVERY 6 HOURS PRN
Status: DISCONTINUED | OUTPATIENT
Start: 2020-11-28 | End: 2020-11-29 | Stop reason: HOSPADM

## 2020-11-28 RX ORDER — SODIUM CHLORIDE 0.9 % (FLUSH) 0.9 %
10 SYRINGE (ML) INJECTION PRN
Status: DISCONTINUED | OUTPATIENT
Start: 2020-11-28 | End: 2020-11-29 | Stop reason: HOSPADM

## 2020-11-28 RX ORDER — SODIUM CHLORIDE 0.9 % (FLUSH) 0.9 %
10 SYRINGE (ML) INJECTION EVERY 12 HOURS SCHEDULED
Status: DISCONTINUED | OUTPATIENT
Start: 2020-11-28 | End: 2020-11-29 | Stop reason: HOSPADM

## 2020-11-28 RX ORDER — ACETAMINOPHEN 325 MG/1
650 TABLET ORAL EVERY 4 HOURS PRN
Status: DISCONTINUED | OUTPATIENT
Start: 2020-11-28 | End: 2020-11-29 | Stop reason: HOSPADM

## 2020-11-28 RX ADMIN — PANTOPRAZOLE SODIUM 40 MG: 40 INJECTION, POWDER, FOR SOLUTION INTRAVENOUS at 20:31

## 2020-11-28 RX ADMIN — PROPOFOL 50 MG: 10 INJECTION, EMULSION INTRAVENOUS at 08:34

## 2020-11-28 RX ADMIN — IOHEXOL 200 ML: 350 INJECTION, SOLUTION INTRAVENOUS at 11:47

## 2020-11-28 RX ADMIN — SODIUM CHLORIDE: 9 INJECTION, SOLUTION INTRAVENOUS at 12:44

## 2020-11-28 RX ADMIN — DOCUSATE SODIUM 50 MG AND SENNOSIDES 8.6 MG 1 TABLET: 8.6; 5 TABLET, FILM COATED ORAL at 10:35

## 2020-11-28 RX ADMIN — PANTOPRAZOLE SODIUM 40 MG: 40 INJECTION, POWDER, FOR SOLUTION INTRAVENOUS at 10:35

## 2020-11-28 RX ADMIN — Medication 10 ML: at 10:36

## 2020-11-28 RX ADMIN — PROPOFOL 50 MG: 10 INJECTION, EMULSION INTRAVENOUS at 07:51

## 2020-11-28 RX ADMIN — DOCUSATE SODIUM 50 MG AND SENNOSIDES 8.6 MG 1 TABLET: 8.6; 5 TABLET, FILM COATED ORAL at 20:34

## 2020-11-28 RX ADMIN — PROPOFOL 50 MG: 10 INJECTION, EMULSION INTRAVENOUS at 08:10

## 2020-11-28 RX ADMIN — ATORVASTATIN CALCIUM 10 MG: 10 TABLET, FILM COATED ORAL at 20:34

## 2020-11-28 RX ADMIN — SODIUM CHLORIDE, SODIUM LACTATE, POTASSIUM CHLORIDE, AND CALCIUM CHLORIDE: 600; 310; 30; 20 INJECTION, SOLUTION INTRAVENOUS at 17:30

## 2020-11-28 RX ADMIN — IRON SUCROSE 200 MG: 20 INJECTION, SOLUTION INTRAVENOUS at 12:58

## 2020-11-28 RX ADMIN — PROPOFOL 50 MG: 10 INJECTION, EMULSION INTRAVENOUS at 07:59

## 2020-11-28 RX ADMIN — PROPOFOL 50 MG: 10 INJECTION, EMULSION INTRAVENOUS at 08:30

## 2020-11-28 RX ADMIN — PROPOFOL 50 MG: 10 INJECTION, EMULSION INTRAVENOUS at 08:04

## 2020-11-28 RX ADMIN — Medication 10 ML: at 10:35

## 2020-11-28 RX ADMIN — METOPROLOL SUCCINATE 25 MG: 25 TABLET, FILM COATED, EXTENDED RELEASE ORAL at 10:35

## 2020-11-28 RX ADMIN — INSULIN LISPRO 13 UNITS: 100 INJECTION, SOLUTION INTRAVENOUS; SUBCUTANEOUS at 17:33

## 2020-11-28 ASSESSMENT — PULMONARY FUNCTION TESTS
PIF_VALUE: 1
PIF_VALUE: 0
PIF_VALUE: 1
PIF_VALUE: 0

## 2020-11-28 ASSESSMENT — PAIN SCALES - GENERAL
PAINLEVEL_OUTOF10: 0

## 2020-11-28 NOTE — PROGRESS NOTES
Cardiac cath:      Left main coronary normal.  Left anterior descending coronary normal diagonal branch normal; septal perforators normal.  Left circumflex very large and dominant; obtuse marginal branch normal; left posterior descending normal.  RCA nondominant four RV branches all normal.  LVEDP 14 mmHg. LVEF 55% on ventriculogram without wall motion abnormality. No coronary intervention needed. Manual pressure right femoral arteriotomy with hemostasis achieved. Plan hydration bedrest 5 hours. May discharge later today from a cardiac perspective if groin stable and in the care of a responsible adult to observe for bleeding at the entry site.

## 2020-11-28 NOTE — PROGRESS NOTES
Pt returned from cath lab, R fem puncture site c/d/i, no s/s bleeding, /82, HR 61, spo2 97% on RA. Pt denies pain/ SOB, no c/o any at this time. Verbalized understanding of strict bedrest for 5 hours. Call light in reach, will cont to monitor.

## 2020-11-28 NOTE — PLAN OF CARE
Brief Pre-Op Note/Sedation Assessment      Vijay Jung  [birth year]  [id number]      [id number]  11:16 AM    Planned Procedure: Cardiac Catheterization Procedure    Post Procedure Plan: Return to same level of care    Consent: I have discussed with the patient and/or the patient representative the indication, alternatives, and the possible risks and/or complications of the planned procedure and the anesthesia methods. The patient and/or patient representative appear to understand and agree to proceed. Chief Complaint: Chest Pain/Pressure      Indications for Cath Procedure:   Worsening Angina  Anginal Classification within 2 weeks:  CCS II - Slight limitation, with angina only during vigorous physical activity  NYHA Heart Failure Class within 2 weeks: No symptoms  Is Cath Lab Visit Valve-related?: No  Surgical Risk: Low  Functional Type: >= 4 METS with symptoms    Anti- Anginal Meds within 2 weeks:   Yes: Beta Blockers    Stress or Imaging Studies Performed (within 6 months):  Stress Test with SPECT Result: Positive:  anteroapical Risk/Extent of Ischemia:  Intermediate     Vital Signs:  BP (!) 142/87   Pulse 70   Temp 98 °F (36.7 °C) (Oral)   Resp 18   Ht 6' 3\" (1.905 m)   Wt (!) 330 lb (149.7 kg)   SpO2 96%   BMI 41.25 kg/m²     Allergies:  No Known Allergies    Past Medical History:  Past Medical History:   Diagnosis Date    Diabetes mellitus ([facility] [address]          Surgical History:  Past Surgical History:   Procedure Laterality Date    CHOLECYSTECTOMY           Medications:  Current Facility-Administered Medications   Medication Dose Route Frequency Provider Last Rate Last Dose    sodium chloride flush 0.9 % injection 10 mL  10 mL Intravenous BID Loraine Khan MD   10 mL at 11/28/20 1036    iron sucrose (VENOFER) 200 mg in sodium chloride 0.9 % 100 mL IVPB  200 mg Intravenous Q24H Loraine Khan MD   Stopped at 11/27/20 1445    pantoprazole (PROTONIX) injection 40 mg  40 mg Intravenous BID Mickie Mcneil MD   40 mg at 11/28/20 1035    metoprolol succinate (TOPROL XL) extended release tablet 25 mg  25 mg Oral Daily Liz Mendez MD   25 mg at 11/28/20 1035    atorvastatin (LIPITOR) tablet 10 mg  10 mg Oral Nightly Liz Mendez MD   10 mg at 11/27/20 2013    glucose (GLUTOSE) 40 % oral gel 15 g  15 g Oral PRN Brenden Baxter MD        dextrose 50 % IV solution  12.5 g Intravenous PRN Brenden Baxter MD        glucagon (rDNA) injection 1 mg  1 mg Intramuscular PRN Brenden Baxter MD        dextrose 5 % solution  100 mL/hr Intravenous PRN Brenden Baxter MD        insulin lispro (1 Unit Dial) 13 Units  0.08 Units/kg Subcutaneous TID  Brenden Baxter MD        insulin lispro (1 Unit Dial) 0-12 Units  0-12 Units Subcutaneous TID  Brenden Baxetr MD        insulin lispro (1 Unit Dial) 0-6 Units  0-6 Units Subcutaneous Nightly Brenden Baxter MD   1 Units at 11/25/20 2250    morphine (PF) injection 2 mg  2 mg Intravenous Q2H PRN Brenden Baxter MD        lactated ringers infusion   Intravenous Continuous Mickie Mcneil MD 75 mL/hr at 11/27/20 1531      sodium chloride flush 0.9 % injection 10 mL  10 mL Intravenous 2 times per day Brenden Baxter MD   10 mL at 11/28/20 1035    sodium chloride flush 0.9 % injection 10 mL  10 mL Intravenous PRN Brenden Baxter MD        acetaminophen (TYLENOL) tablet 650 mg  650 mg Oral Q6H PRN Brenden Baxter MD        Or   Pavel Self acetaminophen (TYLENOL) suppository 650 mg  650 mg Rectal Q6H PRN Brenden Baxter MD        sennosides-docusate sodium (SENOKOT-S) 8.6-50 MG tablet 1 tablet  1 tablet Oral BID Brenden Baxter MD   1 tablet at 11/28/20 1035    magnesium hydroxide (MILK OF MAGNESIA) 400 MG/5ML suspension 30 mL  30 mL Oral Daily PRN Brenden Baxter MD        promethazine (PHENERGAN) tablet 12.5 mg  12.5 mg Oral Q6H PRN Brenden Baxter MD        Or    ondansetron [facility]) injection 4 mg  4 mg Intravenous Q6H PRN Brenden Baxter MD Pre-Sedation:    Pre-Sedation Documentation and Exam:  I have personally completed a history, physical exam & review of systems for this patient (see notes). Prior History of Anesthesia Complications:   none    Modified Mallampati:  II (soft palate, uvula, fauces visible)    ASA Classification:  Class 2 - A normal healthy patient with mild systemic disease      Vipul Scale: Activity:  2 - Able to move 4 extremities voluntarily on command  Respiration:  2 - Able to breathe deeply and cough freely  Circulation:  1 - BP+/- 20-50mmHg of normal  Consciousness:  2 - Fully awake  Oxygen Saturation (color):  2 - Able to maintain oxygen saturation >92% on room air    Sedation/Anesthesia Plan:  Guard the patient's safety and welfare. Minimize physical discomfort and pain. Minimize negative psychological responses to treatment by providing sedation and analgesia and maximize the potential amnesia. Patient to meet pre-procedure discharge plan.     Medication Planned:  midazolam intravenously and fentanyl intravenously    Patient is an appropriate candidate for plan of sedation: yes      Electronically signed by Chris Benson MD on 11/28/2020 at 11:16 AM

## 2020-11-28 NOTE — PLAN OF CARE
Problem: Pain:  Description: Pain management should include both nonpharmacologic and pharmacologic interventions. Goal: Pain level will decrease  Description: Pain level will decrease  Outcome: Ongoing  Note: Patient resting comfortably in bed at this time, denying pain. Patient instructed to notify this RN if he is to begin experiencing pain, patient verbalizes understanding.

## 2020-11-28 NOTE — ANESTHESIA POSTPROCEDURE EVALUATION
Department of Anesthesiology  Postprocedure Note    Patient: Anayeli Avendano  MRN: [id number]  YOB: 1950  Date of evaluation: 11/28/2020  Time:  8:45 AM     Procedure Summary     Date:  11/28/20 Room / Location:  [facility]    Anesthesia Start:  8773 Anesthesia Stop:  0845    Procedure:  EGD BIOPSY (N/A ) Diagnosis:  (GI bLEED)    Surgeon:  Yanira Conklin MD Responsible Provider:  Viet Alba MD    Anesthesia Type:  MAC ASA Status:  3          Anesthesia Type: MAC    Vipul Phase I:      Vipul Phase II: Vipul Score: 7    Last vitals: Reviewed and per EMR flowsheets.        Anesthesia Post Evaluation    Patient location during evaluation: bedside  Patient participation: complete - patient participated  Level of consciousness: awake  Pain score: 0  Airway patency: patent  Nausea & Vomiting: no nausea and no vomiting  Complications: no  Cardiovascular status: hemodynamically stable  Respiratory status: acceptable  Hydration status: euvolemic

## 2020-11-28 NOTE — ANESTHESIA PRE PROCEDURE
Department of Anesthesiology  Preprocedure Note       Name:  Rea Espinoza   Age:  65 y.o.  :  1950                                          MRN:  [id number]         Date:  2020      Surgeon: Joey Rooney):  Vernon Head MD    Procedure: Procedure(s):  EGD DIAGNOSTIC ONLY    Medications prior to admission:   Prior to Admission medications    Medication Sig Start Date End Date Taking? Authorizing Provider   metFORMIN (GLUCOPHAGE) 1000 MG tablet Take 500 mg by mouth 2 times daily (with meals)    Historical Provider, MD   linagliptin (TRADJENTA) 5 MG tablet Take 5 mg by mouth daily    Historical Provider, MD   meloxicam (MOBIC) 7.5 MG tablet Take 7.5 mg by mouth daily    Historical Provider, MD   glipiZIDE (GLUCOTROL) 5 MG tablet Take 5 mg by mouth 2 times daily (before meals)    Historical Provider, MD   methocarbamol (ROBAXIN) 750 MG tablet Take 750 mg by mouth 4 times daily    Historical Provider, MD   aspirin 81 MG EC tablet Take 81 mg by mouth daily    Historical Provider, MD       Current medications:    No current facility-administered medications for this visit. No current outpatient medications on file.      Facility-Administered Medications Ordered in Other Visits   Medication Dose Route Frequency Provider Last Rate Last Dose    sodium chloride flush 0.9 % injection 10 mL  10 mL Intravenous BID Prudencio Seaman MD        iron sucrose (VENOFER) 200 mg in sodium chloride 0.9 % 100 mL IVPB  200 mg Intravenous Q24H Prudencio Seaman MD   Stopped at 20 1445    pantoprazole (PROTONIX) injection 40 mg  40 mg Intravenous BID Prudencio Seaman MD   40 mg at 20    metoprolol succinate (TOPROL XL) extended release tablet 25 mg  25 mg Oral Daily Vinicio Harkins MD   25 mg at 20 1536    atorvastatin (LIPITOR) tablet 10 mg  10 mg Oral Nightly Vinicio Harkins MD   10 mg at 20    glucose (GLUTOSE) 40 % oral gel 15 g  15 g Oral PRN Shari Coronado Smoker    Smokeless tobacco: Never Used   Substance Use Topics    Alcohol use: Not Currently                                Counseling given: Not Answered      Vital Signs (Current): There were no vitals filed for this visit.                                            BP Readings from Last 3 Encounters:   11/28/20 109/66       NPO Status:                                                                                 BMI:   Wt Readings from Last 3 Encounters:   11/27/20 (!) 330 lb (149.7 kg)     There is no height or weight on file to calculate BMI.    CBC:   Lab Results   Component Value Date    WBC 7.5 11/28/2020    RBC 4.31 11/28/2020    HGB 10.7 11/28/2020    HCT 34.1 11/28/2020    MCV 79.1 11/28/2020    RDW 17.6 11/28/2020     11/28/2020       CMP:   Lab Results   Component Value Date     11/28/2020    K 4.1 11/28/2020    CL 97 11/28/2020    CO2 29 11/28/2020    BUN 8 11/28/2020    CREATININE 0.9 11/28/2020    GFRAA >60 11/28/2020    LABGLOM >60 11/28/2020    GLUCOSE 105 11/28/2020    PROT 8.2 11/25/2020    CALCIUM 9.2 11/28/2020    BILITOT 0.4 11/25/2020    ALKPHOS 64 11/25/2020    AST 22 11/25/2020    ALT 15 11/25/2020       POC Tests:   Recent Labs     11/27/20 2012   POCGLU 103*       Coags: No results found for: PROTIME, INR, APTT    HCG (If Applicable): No results found for: PREGTESTUR, PREGSERUM, HCG, HCGQUANT     ABGs: No results found for: PHART, PO2ART, IQB5KSJ, WBH9TEY, BEART, [id number]     Type & Screen (If Applicable):  No results found for: LABABO, LABRH    Drug/Infectious Status (If Applicable):  No results found for: HIV, HEPCAB    COVID-19 Screening (If Applicable): No results found for: COVID19      Anesthesia Evaluation  Patient summary reviewed and Nursing notes reviewed  Airway: Mallampati: III  TM distance: >3 FB   Neck ROM: full  Mouth opening: > = 3 FB Dental: normal exam   (+) upper dentures and partials      Pulmonary:Negative Pulmonary ROS and normal exam  breath sounds clear to auscultation      (-) asthma          Patient did not smoke on day of surgery. Cardiovascular:Negative CV ROS  Exercise tolerance: no interval change,           Rhythm: regular  Rate: normal                    Neuro/Psych:   Negative Neuro/Psych ROS     (-) seizures and CVA           GI/Hepatic/Renal: Neg GI/Hepatic/Renal ROS            Endo/Other:    (+) DiabetesType II DM, well controlled, , .                 Abdominal:   (+) obese,     Abdomen: soft. Vascular: negative vascular ROS. Anesthesia Plan      MAC     ASA 3       Induction: intravenous. MIPS: Postoperative opioids intended and Prophylactic antiemetics administered. Anesthetic plan and risks discussed with patient. Use of blood products discussed with patient whom consented to blood products. Plan discussed with attending and CRNA.     Attending anesthesiologist reviewed and agrees with Pre Eval content              Susan Ashley MD   11/28/2020

## 2020-11-28 NOTE — PROCEDURES
[address] [facility]               [address]                            CARDIAC CATHETERIZATION    PATIENT NAME: Win Bray                 :        1950  MED REC NO:   [id number]                          ROOM:       4936  ACCOUNT NO:   [de-identified]                           ADMIT DATE: 2020  PROVIDER:     Emmy Alex. Kierra Barr MD    DATE OF PROCEDURE:  2020    INDICATION FOR PROCEDURE:  Chest pain, abnormal nuclear cardiac scan  with anterior apical ischemia. DESCRIPTION OF PROCEDURE:  After informed consent was obtained, the  patient was prepped and draped in sterile manner, locally anesthetized  with 1% lidocaine, right femoral region. A 5-Maltese sheath was placed  in a retrograde manner, right femoral artery over a guidewire. Juve Files, JR4 catheters and angled pigtail catheter were used during the  diagnostic procedure. All were aspirated and flushed prior to use and  advanced under fluoroscopic guidance over a guidewire. All catheters  were retracted over guidewire as well. FINDINGS:  Left main coronary artery is very large and normal.  Left  anterior descending coronary artery is large and normal.  Diagonal  branch normal.  LAD courses around the apex of left ventricle without  focal obstruction. Circumflex is very large and normal.  Obtuse  marginal branches one and two are normal.  Left posterior descending  coronary artery is normal.    JR4 catheter engages right coronary artery, injection shows normal,  nondominant right coronary artery with many RV branches, they are all  normal.    Pigtail catheter and left ventricle post angiography show left  ventricular end-diastolic pressure of 14 mmHg. Power injection in BOSS  projection shows LV ejection fraction 55% without wall motion  abnormality. No gradient upon pullback from the left ventricle to the  ascending aorta.   No mitral regurgitation seen.    Ejection femoral sheath shows sheath is located near the bifurcation. PLAN:  Manual pressure, bedrest, can be discharged later today with a  responsible adult to watch the arteriotomy site is stable and ambulatory  without problems on the groin site. No cardiac medications required. No obstructive coronary artery disease identified. Please note that the  estimated blood loss was less than 15 mL.         Grace Fabian MD    D: 11/28/2020 15:15:23       T: 11/28/2020 16:03:17     JOAN/[email or web address]  Job#: [id number]     Doc#: [id number]    CC:

## 2020-11-29 VITALS
HEART RATE: 76 BPM | HEIGHT: 75 IN | DIASTOLIC BLOOD PRESSURE: 75 MMHG | TEMPERATURE: 98 F | BODY MASS INDEX: 39.17 KG/M2 | WEIGHT: 315 LBS | OXYGEN SATURATION: 97 % | SYSTOLIC BLOOD PRESSURE: 126 MMHG | RESPIRATION RATE: 18 BRPM

## 2020-11-29 LAB
ANION GAP SERPL CALCULATED.3IONS-SCNC: 7 MMOL/L (ref 3–16)
BUN BLDV-MCNC: 7 MG/DL (ref 7–20)
CALCIUM SERPL-MCNC: 9 MG/DL (ref 8.3–10.6)
CHLORIDE BLD-SCNC: 101 MMOL/L (ref 99–110)
CO2: 28 MMOL/L (ref 21–32)
CREAT SERPL-MCNC: 0.8 MG/DL (ref 0.8–1.3)
GFR AFRICAN AMERICAN: >60
GFR NON-AFRICAN AMERICAN: >60
GLUCOSE BLD-MCNC: 112 MG/DL (ref 70–99)
GLUCOSE BLD-MCNC: 128 MG/DL (ref 70–99)
GLUCOSE BLD-MCNC: 97 MG/DL (ref 70–99)
HCT VFR BLD CALC: 31.9 % (ref 40.5–52.5)
HEMOGLOBIN: 10.3 G/DL (ref 13.5–17.5)
MCH RBC QN AUTO: 25.6 PG (ref 26–34)
MCHC RBC AUTO-ENTMCNC: 32.4 G/DL (ref 31–36)
MCV RBC AUTO: 79.1 FL (ref 80–100)
PDW BLD-RTO: 17.3 % (ref 12.4–15.4)
PERFORMED ON: ABNORMAL
PERFORMED ON: ABNORMAL
PLATELET # BLD: 168 K/UL (ref 135–450)
PMV BLD AUTO: 8.4 FL (ref 5–10.5)
POTASSIUM REFLEX MAGNESIUM: 4 MMOL/L (ref 3.5–5.1)
RBC # BLD: 4.03 M/UL (ref 4.2–5.9)
SODIUM BLD-SCNC: 136 MMOL/L (ref 136–145)
WBC # BLD: 6.9 K/UL (ref 4–11)

## 2020-11-29 PROCEDURE — C9113 INJ PANTOPRAZOLE SODIUM, VIA: HCPCS | Performed by: INTERNAL MEDICINE

## 2020-11-29 PROCEDURE — 6360000002 HC RX W HCPCS: Performed by: INTERNAL MEDICINE

## 2020-11-29 PROCEDURE — 80048 BASIC METABOLIC PNL TOTAL CA: CPT

## 2020-11-29 PROCEDURE — 85027 COMPLETE CBC AUTOMATED: CPT

## 2020-11-29 PROCEDURE — 6370000000 HC RX 637 (ALT 250 FOR IP): Performed by: INTERNAL MEDICINE

## 2020-11-29 PROCEDURE — 2580000003 HC RX 258: Performed by: INTERNAL MEDICINE

## 2020-11-29 RX ORDER — PANTOPRAZOLE SODIUM 40 MG/1
40 TABLET, DELAYED RELEASE ORAL
Qty: 180 TABLET | Refills: 0 | Status: SHIPPED | OUTPATIENT
Start: 2020-11-29

## 2020-11-29 RX ADMIN — INSULIN LISPRO 13 UNITS: 100 INJECTION, SOLUTION INTRAVENOUS; SUBCUTANEOUS at 10:11

## 2020-11-29 RX ADMIN — DOCUSATE SODIUM 50 MG AND SENNOSIDES 8.6 MG 1 TABLET: 8.6; 5 TABLET, FILM COATED ORAL at 10:08

## 2020-11-29 RX ADMIN — SODIUM CHLORIDE, SODIUM LACTATE, POTASSIUM CHLORIDE, AND CALCIUM CHLORIDE: 600; 310; 30; 20 INJECTION, SOLUTION INTRAVENOUS at 01:59

## 2020-11-29 RX ADMIN — Medication 10 ML: at 10:08

## 2020-11-29 RX ADMIN — METOPROLOL SUCCINATE 25 MG: 25 TABLET, FILM COATED, EXTENDED RELEASE ORAL at 10:08

## 2020-11-29 RX ADMIN — PANTOPRAZOLE SODIUM 40 MG: 40 INJECTION, POWDER, FOR SOLUTION INTRAVENOUS at 10:08

## 2020-11-29 NOTE — DISCHARGE INSTR - DIET

## 2020-11-29 NOTE — PROGRESS NOTES
Hospitalist Progress Note      PCP: No primary care provider on file. Date of Admission: 11/25/2020    Chief Complaint: abd pain, vomiting,    Hospital Course: This is a 65 y.o. male who has PMHx of T2DM, Vitamin D deficiency, DDD Lumbar, h/o alcoholism,who presented to [facility]. with abdominal pain, emesis. Subjective:  Pt. Reports feeling better. He just returned from cardiac cath and UGI endoscopy. He is hungry. Medications:  Reviewed    Infusion Medications    dextrose      lactated ringers 75 mL/hr at 11/28/20 1730     Scheduled Medications    sodium chloride flush  10 mL Intravenous 2 times per day    pantoprazole  40 mg Intravenous BID    metoprolol succinate  25 mg Oral Daily    atorvastatin  10 mg Oral Nightly    insulin lispro  0.08 Units/kg Subcutaneous TID WC    insulin lispro  0-12 Units Subcutaneous TID WC    insulin lispro  0-6 Units Subcutaneous Nightly    sennosides-docusate sodium  1 tablet Oral BID     PRN Meds: sodium chloride flush, acetaminophen, atropine, ondansetron, glucose, dextrose, glucagon (rDNA), dextrose, morphine, sodium chloride flush, magnesium hydroxide      Intake/Output Summary (Last 24 hours) at 11/28/2020 1911  Last data filed at 11/28/2020 1732  Gross per 24 hour   Intake 2986 ml   Output 275 ml   Net 2711 ml       Physical Exam Performed:    /89   Pulse 74   Temp 98.2 °F (36.8 °C) (Oral)   Resp 16   Ht 6' 3\" (1.905 m)   Wt (!) 330 lb (149.7 kg)   SpO2 98%   BMI 41.25 kg/m²     General appearance: Morbidly obese male, no apparent distress, appears stated age and cooperative. HEENT: Pupils equal, round, and reactive to light. Conjunctivae/corneas clear. Neck: Supple, with full range of motion. No jugular venous distention. Trachea midline. Respiratory:  Normal respiratory effort. Clear to auscultation, bilaterally without Rales/Wheezes/Rhonchi.   Cardiovascular: Regular rate and rhythm with normal S1/S2 without murmurs, rubs or gallops. Abdomen:NT/ND       Labs:   Recent Labs     11/26/20  0621  11/27/20  0645 11/27/20  2155 11/28/20  0635   WBC 9.7  --  6.8  --  7.5   HGB 10.3*   < > 10.5* 11.2* 10.7*   HCT 31.6*   < > 33.0* 34.7* 34.1*     --  161  --  182    < > = values in this interval not displayed. Recent Labs     11/26/20  0621 11/27/20  0644 11/28/20  0635   * 134* 133*   K 4.2 4.4 4.1   CL 98* 99 97*   CO2 27 30 29   BUN 13 10 8   CREATININE 0.8 0.9 0.9   CALCIUM 9.0 8.9 9.2     No results for input(s): AST, ALT, BILIDIR, BILITOT, ALKPHOS in the last 72 hours. No results for input(s): INR in the last 72 hours. Recent Labs     11/26/20  1206 11/26/20  1729   TROPONINI <0.01 <0.01       Urinalysis:      Lab Results   Component Value Date    NITRU Negative 11/25/2020    WBCUA 3-5 11/25/2020    BACTERIA Rare 11/25/2020    RBCUA None seen 11/25/2020    BLOODU Negative 11/25/2020    SPECGRAV 1.020 11/25/2020    GLUCOSEU Negative 11/25/2020       Radiology:  NM MYOCARDIAL SPECT REST EXERCISE OR RX   Final Result      CT ABDOMEN PELVIS W IV CONTRAST Additional Contrast? None   Final Result      1. No acute abnormality in the abdomen and pelvis. XR ACUTE ABD SERIES CHEST 1 VW   Final Result        Colonoscopy 08/2020  #1 minimal scattered diverticulosis. #2 diminutive rectal sessile polyp removed using cold biopsy polypectomy. 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 The patient was then decompressed and the scope was then withdrawn. The patient tolerated the procedure well. There were no immediate complications. IMPRESSION:   Small rectal polyp, old EMR site in the rectum biopsied and diverticulosis  Otherwise normal colonoscopy. RECOMMENDATIONS: Follow-up on biopsy results. Repeat colonoscopy 3 years. Assessment/Plan:    Active Hospital Problems    Diagnosis Date Noted    Acute GI bleeding [K92.2] 11/25/2020     1.  Acute Gastrointestinal Bleeding, tarry stools with abdominal pain nausea vomiting, concern for Gastritis, Peptic ulcer disease. CT ruled out perforation. 2. Acute blood loss anemia  3. Chest pain and dyspnea on exertion x few weeks concern for ACS  4. Second Degree Mobitz Type 1 block  5. Type 2 Diabetes w/ Neuropathy, A1c 6/2020 5.8  6. Chronic low back pain due to lumbar DDD  7. Vitamin D Deficiency  8. Hx of heavy Etoh use  9. Hx of Smoking 0.5 PPD  10. Morbid obesity due to excess calories Body mass index is 41.25 kg/m². - Complicating assessment and treatment. Placing patient at risk for multiple co-morbidities as well as early death and contributing to the patient's presentation.  on weight loss when appropriate. Plan:  S/p endoscopy: awaiting report. S/p cardiac cath:no stents, no cardiac meds. -Protonix 40 mg IV twice daily  -IVF - may d/c tomorrow if pt. Is tolerating diet well. -Venofer  -cardio, GI consults appreciated. DVT Prophylaxis: SCDs  Diet: DIET GENERAL; Carb Control: 4 carb choices (60 gms)/meal; No Caffeine  - Code Status: Full Code    PT/OT Eval Status: N/A    Dispo - possible d/c tomorrow.   Diego Perez MD   Hospitalist

## 2020-11-29 NOTE — PLAN OF CARE
Problem: Pain:  Goal: Pain level will decrease  Description: Pain level will decrease  11/29/2020 1031 by Ash Chinchilla RN  Outcome: Completed  11/29/2020 0618 by Eleni Aviles RN  Outcome: Ongoing  Note: Pt had some mild chronic back pain at start of night shift, but this diminished overnight. Denies any chest pain or discomfort. Will monitor.     Goal: Control of acute pain  Description: Control of acute pain  11/29/2020 1031 by Ash Chinchilla RN  Outcome: Completed  11/29/2020 0618 by Eleni Aviles RN  Outcome: Ongoing  Goal: Control of chronic pain  Description: Control of chronic pain  11/29/2020 1031 by Ash Chinchilla RN  Outcome: Completed  11/29/2020 0618 by Eleni Aviles RN  Outcome: Ongoing

## 2020-11-29 NOTE — PLAN OF CARE
Problem: Pain:  Goal: Pain level will decrease  Description: Pain level will decrease  11/29/2020 1031 by Estella Talavera RN  Outcome: Completed  11/29/2020 0618 by Loy Carpio RN  Outcome: Ongoing  Note: Pt had some mild chronic back pain at start of night shift, but this diminished overnight. Denies any chest pain or discomfort. Will monitor.     Goal: Control of acute pain  Description: Control of acute pain  11/29/2020 1031 by Estella Talavera RN  Outcome: Completed  11/29/2020 0618 by Loy Carpio RN  Outcome: Ongoing  Goal: Control of chronic pain  Description: Control of chronic pain  11/29/2020 1031 by Estella Talavera RN  Outcome: Completed  11/29/2020 0618 by Loy Carpio RN  Outcome: Ongoing

## 2020-11-29 NOTE — PLAN OF CARE
Problem: Pain:  Goal: Pain level will decrease  Description: Pain level will decrease  Outcome: Ongoing  Note: Pt had some mild chronic back pain at start of night shift, but this diminished overnight. Denies any chest pain or discomfort. Will monitor.

## 2020-11-29 NOTE — DISCHARGE INSTR - COC
Continuity of Care Form    Patient Name: Marianela Youngblood   :    MRN:  [id number]    Admit date:  2020  Discharge date:  ***    Code Status Order: Full Code   Advance Directives:   Advance Care Flowsheet Documentation       Date/Time Healthcare Directive Type of Healthcare Directive Copy in [address] Agent's Name Healthcare Agent's Phone Number    [phone]  Yes, patient has an advance directive for healthcare treatment  Living will  --  --  --  --            Admitting Physician:  Christian Morris MD  PCP: No primary care provider on file. Discharging Nurse: [facility] Unit/Room#: 0566/9407-41  Discharging Unit Phone Number: ***    Emergency Contact:   Extended Emergency Contact Information  Primary Emergency Contact: gloria francis  Address: [address] Phone: [phone]  Mobile Phone: [phone]  Relation: Spouse  Preferred language: English   needed? No    Past Surgical History:  Past Surgical History:   Procedure Laterality Date    CHOLECYSTECTOMY         Immunization History: There is no immunization history on file for this patient.     Active Problems:  Patient Active Problem List   Diagnosis Code    Acute GI bleeding K92.2       Isolation/Infection:   Isolation            No Isolation          Patient Infection Status       None to display            Nurse Assessment:  Last Vital Signs: /75   Pulse 76   Temp 98 °F (36.7 °C) (Oral)   Resp 18   Ht 6' 3\" (1.905 m)   Wt (!) 330 lb (149.7 kg)   SpO2 97%   BMI 41.25 kg/m²     Last documented pain score (0-10 scale): Pain Level: 0  Last Weight:   Wt Readings from Last 1 Encounters:   20 (!) 330 lb (149.7 kg)     Mental Status:  {IP PT MENTAL STATUS::::0}    IV Access:  { DAWN IV ACCESS:386340737:::0}    Nursing Mobility/ADLs:  Walking   {[facility] DME ADLs:610872189:::0}  Transfer  {P DME ADLs:554226459:::0}  Bathing  {CHP DME ADLs:591791765:::0}  Dressing  {CHP DME ADLs:755861032:::0}  Toileting  {CHP DME ADLs:982064847:::0}  Feeding  {CHP DME ADLs:307594627:::0}  Med Admin  {CHP DME ADLs:940677369:::0}  Med Delivery   { DAWN MED Delivery:055440249:::0}    Wound Care Documentation and Therapy:        Elimination:  Continence: Bowel: {YES / TZ:64126}  Bladder: {YES / JK:75758}  Urinary Catheter: {Urinary Catheter:298134678:::0}   Colostomy/Ileostomy/Ileal Conduit: {YES / TV:43481}       Date of Last BM: ***    Intake/Output Summary (Last 24 hours) at 2020 1028  Last data filed at 2020 1007  Gross per 24 hour   Intake 2366 ml   Output 275 ml   Net 2091 ml     I/O last 3 completed shifts:   In: 2126 [P.O.:680; I.V.:1446]  Out: 275 [Urine:275]    Safety Concerns:     508 flikdate Safety Concerns:818742401:::0}    Impairments/Disabilities:      508 flikdate Impairments/Disabilities:007276328:::0}    Nutrition Therapy:  Current Nutrition Therapy:   508 flikdate Diet List:040281486:::0}    Routes of Feeding: {CHP DME Other Feedings:029114482:::0}  Liquids: {Slp liquid thickness:33847}  Daily Fluid Restriction: {CHP DME Yes amt example:560657356:::0}  Last Modified Barium Swallow with Video (Video Swallowing Test): {Done Not Done YFZI:455973820:::4}    Treatments at the Time of Hospital Discharge:   Respiratory Treatments: ***  Oxygen Therapy:  {Therapy; copd oxygen:53836:::0}  Ventilator:    { CC Vent List:612961667:::0}    Rehab Therapies: {THERAPEUTIC INTERVENTION:9946906130}  Weight Bearing Status/Restrictions: 508 Voxel.pl  Weight Bearin:::0}  Other Medical Equipment (for information only, NOT a DME order):  {EQUIPMENT:084462113}  Other Treatments: ***    Patient's personal belongings (please select all that are sent with patient):  {CHP DME Belongings:278958725:::0}    RN SIGNATURE:  {Esignature:146595387:::0}    CASE MANAGEMENT/SOCIAL WORK SECTION    Inpatient Status Date: ***    Readmission

## 2020-11-29 NOTE — DISCHARGE SUMMARY
10. Morbid obesity due to excess calories Body mass index is 41.25 kg/m². - Complicating assessment and treatment. Placing patient at risk for multiple co-morbidities as well as early death and contributing to the patient's presentation.  on weight loss when appropriate.     Plan:  S/p endoscopy: awaiting report. S/p cardiac cath:no stents, no cardiac meds. -Protonix 40 mg IV twice daily  -IVF   -Venofer  -cardio, GI consults appreciated. 1. PPI bid x 8 wks then once daily indefinitely  2. Await bx results. 3. Repeat EGD in 8 wks to document healing of esophagus and r/o underlying Mandel's esophagus    Pt. Improved and was discharged in stable medical condition. Physical Exam Performed:     /75   Pulse 76   Temp 98 °F (36.7 °C) (Oral)   Resp 18   Ht 6' 3\" (1.905 m)   Wt (!) 330 lb (149.7 kg)   SpO2 97%   BMI 41.25 kg/m²       General appearance:  No apparent distress, appears stated age and cooperative. HEENT:  Normal cephalic, atraumatic without obvious deformity. Pupils equal, round, and reactive to light. Extra ocular muscles intact. Conjunctivae/corneas clear. Neck: Supple, with full range of motion. No jugular venous distention. Trachea midline. Respiratory:  Normal respiratory effort. Clear to auscultation, bilaterally without Rales/Wheezes/Rhonchi. Cardiovascular:  Regular rate and rhythm with normal S1/S2 without murmurs, rubs or gallops. Abdomen: Soft, non-tender, non-distended with normal bowel sounds. Musculoskeletal:  No clubbing, cyanosis or edema bilaterally. Full range of motion without deformity. Skin: Skin color, texture, turgor normal.  No rashes or lesions. Neurologic:  Neurovascularly intact without any focal sensory/motor deficits.  Cranial nerves: II-XII intact, grossly non-focal.  Psychiatric:  Alert and oriented, thought content appropriate, normal insight  Capillary Refill: Brisk,< 3 seconds   Peripheral Pulses: +2 palpable, equal bilaterally Labs: For convenience and continuity at follow-up the following most recent labs are provided:      CBC:    Lab Results   Component Value Date    WBC 6.9 11/29/2020    HGB 10.3 11/29/2020    HCT 31.9 11/29/2020     11/29/2020       Renal:    Lab Results   Component Value Date     11/29/2020    K 4.0 11/29/2020     11/29/2020    CO2 28 11/29/2020    BUN 7 11/29/2020    CREATININE 0.8 11/29/2020    CALCIUM 9.0 11/29/2020         Significant Diagnostic Studies    Radiology:   NM MYOCARDIAL SPECT REST EXERCISE OR RX   Final Result      CT ABDOMEN PELVIS W IV CONTRAST Additional Contrast? None   Final Result      1. No acute abnormality in the abdomen and pelvis.          XR ACUTE ABD SERIES CHEST 1 VW   Final Result             Consults:     IP CONSULT TO GI  IP CONSULT TO HOSPITALIST  IP CONSULT TO PHARMACY  IP CONSULT TO CARDIOLOGY    Disposition:  home    Condition at Discharge: stable  Discharge Instructions/Follow-up:  F/u with PCP    Code Status:  Full Code   Activity: activity as tolerated    Diet: cardiac diet      Discharge Medications:     Discharge Medication List as of 11/29/2020 10:33 AM           Details   pantoprazole (PROTONIX) 40 MG tablet Take 1 tablet by mouth 2 times daily (before meals) Take 40 mg PO twice daily x 8 weeks then daily thereafter., Disp-180 tablet,R-0Print      Cholecalciferol (VITAMIN D3) 125 MCG (5000 UT) CAPS Take 1 capsule by mouth daily, Disp-30 capsule,R-0Print              Details   metFORMIN (GLUCOPHAGE) 1000 MG tablet Take 500 mg by mouth 2 times daily (with meals)Historical Med      linagliptin (TRADJENTA) 5 MG tablet Take 5 mg by mouth dailyHistorical Med      glipiZIDE (GLUCOTROL) 5 MG tablet Take 5 mg by mouth 2 times daily (before meals)Historical Med      methocarbamol (ROBAXIN) 750 MG tablet Take 750 mg by mouth 4 times dailyHistorical Med      aspirin 81 MG EC tablet Take 81 mg by mouth dailyHistorical Med Time Spent on discharge is more than 45 minutes in the examination, evaluation, counseling and review of medications and discharge plan. Signed:    Yoav Moreno MD   11/29/2020      Thank you No primary care provider on file. for the opportunity to be involved in this patient's care. If you have any questions or concerns please feel free to contact me at [phone].

## 2020-11-29 NOTE — PROGRESS NOTES
[facility] GI  Gastroenterology Progress Note  Wendie Felix is a 65 y.o. male patient. 1. Abdominal pain, epigastric    2. Black stools        SUBJECTIVE:    No abdom pain. No GIB. Cath yest: no intervention needed. Physical    VITALS:  /80   Pulse 70   Temp 98.2 °F (36.8 °C) (Oral)   Resp 18   Ht 6' 3\" (1.905 m)   Wt (!) 330 lb (149.7 kg)   SpO2 97%   BMI 41.25 kg/m²   TEMPERATURE:  Current - Temp: 98.2 °F (36.8 °C); Max - Temp  Av.8 °F (36.6 °C)  Min: 97.4 °F (36.3 °C)  Max: 98.2 °F (36.8 °C)    Abdomen soft, ND, NT, no HSM, Bowel sounds normal     Data      Recent Labs     20  0645  20  0635 20  1915 20  0637   WBC 6.8  --  7.5  --  6.9   HGB 10.5*   < > 10.7* 10.1* 10.3*   HCT 33.0*   < > 34.1* 31.7* 31.9*   MCV 79.3*  --  79.1*  --  79.1*     --  182  --  168    < > = values in this interval not displayed. Recent Labs     20  0644 20  0635 20  0637   * 133* 136   K 4.4 4.1 4.0   CL 99 97* 101   CO2 30 29 28   BUN 10 8 7   CREATININE 0.9 0.9 0.8     No results for input(s): AST, ALT, ALB, BILIDIR, BILITOT, ALKPHOS in the last 72 hours. No results for input(s): LIPASE, AMYLASE in the last 72 hours. ASSESSMENT   1. Reflux esophagitis/PUD:   EGD 20:   A 6 mm clean-based cratered ulcer was found in the  duodenal bulb. Multiple small 2-3 mm cratered ulcers in the gastric  antrum with surrounding diffuse nodular mucosa  suspicious for H pylori infection. Biopsied for H pylori. Small sliding hiatal hernia. Grade D reflux esophagitis with extensive ulcerations in  the entire lower 1/3 of the esophagus. Biopsied. 2. Fe def anemia: could be explained by above findings.  hgb stable. PLAN    1. PPI bid x 8 wks then once daily indefinitely  2. Await bx results. 3. Repeat EGD in 8 wks to document healing of esophagus and r/o underlying Mandel's esophagus  4.  Ok to d/c to home from [facility], MD  [facility] and [address] [place] [address].

## 2021-01-21 ENCOUNTER — OFFICE VISIT (OUTPATIENT)
Dept: CARDIOLOGY CLINIC | Age: 71
End: 2021-01-21
Payer: MEDICARE

## 2021-01-21 VITALS
HEART RATE: 64 BPM | DIASTOLIC BLOOD PRESSURE: 72 MMHG | HEIGHT: 76 IN | SYSTOLIC BLOOD PRESSURE: 116 MMHG | BODY MASS INDEX: 38.36 KG/M2 | TEMPERATURE: 98.2 F | WEIGHT: 315 LBS

## 2021-01-21 DIAGNOSIS — I10 ESSENTIAL HYPERTENSION: ICD-10-CM

## 2021-01-21 DIAGNOSIS — R07.9 CHEST PAIN, UNSPECIFIED TYPE: Primary | ICD-10-CM

## 2021-01-21 PROCEDURE — 99213 OFFICE O/P EST LOW 20 MIN: CPT | Performed by: INTERNAL MEDICINE

## 2021-01-21 RX ORDER — NITROGLYCERIN 40 MG/1
1 PATCH TRANSDERMAL DAILY
Qty: 90 PATCH | Refills: 3 | Status: SHIPPED | OUTPATIENT
Start: 2021-01-21

## 2021-01-21 ASSESSMENT — ENCOUNTER SYMPTOMS
EYES NEGATIVE: 1
CHOKING: 0
APNEA: 0
GASTROINTESTINAL NEGATIVE: 1
WHEEZING: 0
SHORTNESS OF BREATH: 0
CHEST TIGHTNESS: 0
COUGH: 0
STRIDOR: 0
ALLERGIC/IMMUNOLOGIC NEGATIVE: 1

## 2021-01-21 NOTE — PROGRESS NOTES
Neurological:      General: No focal deficit present. Mental Status: He is alert and oriented to person, place, and time. Psychiatric:         Mood and Affect: Mood normal.         Behavior: Behavior normal.        Diagnosis Orders   1. Chest pain, unspecified type     2. Essential hypertension         Normal cors:  Try nitrodur 0.2 mg/hr daily. Baby asa daily.   Has mild TII DM    --Silvia Charles MD

## 2021-02-02 ENCOUNTER — TELEPHONE (OUTPATIENT)
Dept: CARDIOLOGY CLINIC | Age: 71
End: 2021-02-02

## 2021-02-02 NOTE — TELEPHONE ENCOUNTER
CARDIAC RISK ASSESSMENT    What type of procedure are you having? EGD    Which physician is performing your procedure? Dr. Wilbert Lara    When is your procedure scheduled for?    2/4/21    Where are you having this procedure? [place]    Are you taking Blood Thinners? If so what? (Name/dose/frequesncy)    Aspirin 81 mg daily    Does the surgeon want you to stop your blood thinner? If so for how long? Phone Number and Contact Name for Physicians office:    [phone] [place] [id number] [facility] number to send information:    Patient started wearing nitroglycerin patch 0.2 mg/HR last Monday and [facility] wants to know if it's ok for him to have procedure with patch on.

## 2021-02-03 NOTE — TELEPHONE ENCOUNTER
Informed pt's wife Berta Copeland and Duarte Mcfarland nurse at endoscopy center pt is ok to have procedure tomorrow per TONO. Will fax letter tomorrow. Pt and Duarte Mcfarland verbalized understanding.

## 2022-07-03 ENCOUNTER — HOSPITAL ENCOUNTER (EMERGENCY)
Age: 72
Discharge: HOME | End: 2022-07-03
Attending: EMERGENCY MEDICINE
Payer: MEDICARE

## 2022-07-03 ENCOUNTER — APPOINTMENT (OUTPATIENT)
Dept: GENERAL RADIOLOGY | Age: 72
End: 2022-07-03
Payer: MEDICARE

## 2022-07-03 VITALS
OXYGEN SATURATION: 92 % | DIASTOLIC BLOOD PRESSURE: 65 MMHG | HEART RATE: 74 BPM | SYSTOLIC BLOOD PRESSURE: 98 MMHG | TEMPERATURE: 98 F | RESPIRATION RATE: 18 BRPM

## 2022-07-03 DIAGNOSIS — N17.9 AKI (ACUTE KIDNEY INJURY) (HCC): Primary | ICD-10-CM

## 2022-07-03 DIAGNOSIS — R55 NEAR SYNCOPE: ICD-10-CM

## 2022-07-03 LAB
A/G RATIO: 1.3 (ref 1.1–2.2)
ALBUMIN SERPL-MCNC: 3.9 G/DL (ref 3.4–5)
ALP BLD-CCNC: 56 U/L (ref 40–129)
ALT SERPL-CCNC: 24 U/L (ref 10–40)
ANION GAP SERPL CALCULATED.3IONS-SCNC: 13 MMOL/L (ref 3–16)
ANION GAP SERPL CALCULATED.3IONS-SCNC: 9 MMOL/L (ref 3–16)
AST SERPL-CCNC: 21 U/L (ref 15–37)
BASOPHILS ABSOLUTE: 0 K/UL (ref 0–0.2)
BASOPHILS RELATIVE PERCENT: 0.3 %
BILIRUB SERPL-MCNC: 0.3 MG/DL (ref 0–1)
BUN BLDV-MCNC: 17 MG/DL (ref 7–20)
BUN BLDV-MCNC: 17 MG/DL (ref 7–20)
CALCIUM SERPL-MCNC: 8.5 MG/DL (ref 8.3–10.6)
CALCIUM SERPL-MCNC: 8.7 MG/DL (ref 8.3–10.6)
CHLORIDE BLD-SCNC: 101 MMOL/L (ref 99–110)
CHLORIDE BLD-SCNC: 99 MMOL/L (ref 99–110)
CO2: 25 MMOL/L (ref 21–32)
CO2: 29 MMOL/L (ref 21–32)
CREAT SERPL-MCNC: 1.7 MG/DL (ref 0.8–1.3)
CREAT SERPL-MCNC: 2 MG/DL (ref 0.8–1.3)
EOSINOPHILS ABSOLUTE: 0.1 K/UL (ref 0–0.6)
EOSINOPHILS RELATIVE PERCENT: 1.3 %
GFR AFRICAN AMERICAN: 40
GFR AFRICAN AMERICAN: 48
GFR NON-AFRICAN AMERICAN: 33
GFR NON-AFRICAN AMERICAN: 40
GLUCOSE BLD-MCNC: 108 MG/DL (ref 70–99)
GLUCOSE BLD-MCNC: 110 MG/DL (ref 70–99)
HCT VFR BLD CALC: 33.6 % (ref 40.5–52.5)
HEMOGLOBIN: 10.9 G/DL (ref 13.5–17.5)
LYMPHOCYTES ABSOLUTE: 1.4 K/UL (ref 1–5.1)
LYMPHOCYTES RELATIVE PERCENT: 19.4 %
MCH RBC QN AUTO: 28.2 PG (ref 26–34)
MCHC RBC AUTO-ENTMCNC: 32.6 G/DL (ref 31–36)
MCV RBC AUTO: 86.6 FL (ref 80–100)
MONOCYTES ABSOLUTE: 0.7 K/UL (ref 0–1.3)
MONOCYTES RELATIVE PERCENT: 9.4 %
NEUTROPHILS ABSOLUTE: 5.2 K/UL (ref 1.7–7.7)
NEUTROPHILS RELATIVE PERCENT: 69.6 %
PDW BLD-RTO: 15.7 % (ref 12.4–15.4)
PLATELET # BLD: 146 K/UL (ref 135–450)
PMV BLD AUTO: 9.3 FL (ref 5–10.5)
POTASSIUM REFLEX MAGNESIUM: 3.9 MMOL/L (ref 3.5–5.1)
POTASSIUM REFLEX MAGNESIUM: 4.1 MMOL/L (ref 3.5–5.1)
PRO-BNP: 232 PG/ML (ref 0–449)
RBC # BLD: 3.88 M/UL (ref 4.2–5.9)
SODIUM BLD-SCNC: 137 MMOL/L (ref 136–145)
SODIUM BLD-SCNC: 139 MMOL/L (ref 136–145)
TOTAL PROTEIN: 7 G/DL (ref 6.4–8.2)
TROPONIN: <0.01 NG/ML
TROPONIN: <0.01 NG/ML
WBC # BLD: 7.4 K/UL (ref 4–11)

## 2022-07-03 PROCEDURE — 85025 COMPLETE CBC W/AUTO DIFF WBC: CPT

## 2022-07-03 PROCEDURE — 36415 COLL VENOUS BLD VENIPUNCTURE: CPT

## 2022-07-03 PROCEDURE — 2580000003 HC RX 258

## 2022-07-03 PROCEDURE — 99285 EMERGENCY DEPT VISIT HI MDM: CPT

## 2022-07-03 PROCEDURE — 80053 COMPREHEN METABOLIC PANEL: CPT

## 2022-07-03 PROCEDURE — 83880 ASSAY OF NATRIURETIC PEPTIDE: CPT

## 2022-07-03 PROCEDURE — 93005 ELECTROCARDIOGRAM TRACING: CPT

## 2022-07-03 PROCEDURE — 71046 X-RAY EXAM CHEST 2 VIEWS: CPT

## 2022-07-03 PROCEDURE — 84484 ASSAY OF TROPONIN QUANT: CPT

## 2022-07-03 RX ORDER — SODIUM CHLORIDE, SODIUM LACTATE, POTASSIUM CHLORIDE, AND CALCIUM CHLORIDE .6; .31; .03; .02 G/100ML; G/100ML; G/100ML; G/100ML
500 INJECTION, SOLUTION INTRAVENOUS ONCE
Status: COMPLETED | OUTPATIENT
Start: 2022-07-03 | End: 2022-07-03

## 2022-07-03 RX ADMIN — SODIUM CHLORIDE, SODIUM LACTATE, POTASSIUM CHLORIDE, AND CALCIUM CHLORIDE 500 ML: .6; .31; .03; .02 INJECTION, SOLUTION INTRAVENOUS at 14:00

## 2022-07-03 ASSESSMENT — ENCOUNTER SYMPTOMS
BACK PAIN: 0
DIARRHEA: 0
VOMITING: 0
EYE ITCHING: 0
CHOKING: 0
SORE THROAT: 0
RHINORRHEA: 0
SHORTNESS OF BREATH: 0
ABDOMINAL PAIN: 0
NAUSEA: 0
COUGH: 0
CONSTIPATION: 0
EYE PAIN: 0
EYE DISCHARGE: 0
WHEEZING: 0

## 2022-07-03 ASSESSMENT — PAIN - FUNCTIONAL ASSESSMENT
PAIN_FUNCTIONAL_ASSESSMENT: NONE - DENIES PAIN
PAIN_FUNCTIONAL_ASSESSMENT: NONE - DENIES PAIN

## 2022-07-03 NOTE — ED PROVIDER NOTES
Anand ASSISTANT NOTE       ED Attending Attestation Note     Date of evaluation: 7/3/2022    This patient was seen by the advance practice provider. I have seen and examined the patient, agree with the workup, evaluation, management and diagnosis. The care plan has been discussed. I have reviewed the ECG and concur with the ASHER's interpretation. My assessment reveals   ED Course as of 07/22/22 1551   Sun Jul 03, 2022   0995 Attending note: 68-year-old gentleman with history of nonischemic cardiomyopathy status post pacemaker and type 2 diabetes who presents to the emergency department after near syncopal event a few hours prior to arrival.  Patient notes that he has been taking all of his medications including his diuretics as prescribed, states that he feels that he was tolerating adequate p.o. specially water today but was outside since 5 AM in the heat and also cooking over an open flame grill. He stated that he felt lightheaded and sat down was very sweaty. Family was concerned so they called EMS for further evaluation patient was brought to the emergency department. He remembers all the events very well had no witnessed convulsive-like activity, denied any significant numbness weakness or paresthesias, nausea or vomiting or diarrhea, denies dysuria, denies fevers or chills, denied any preceding or afterwards chest pain or shortness of breath. Exam: patient is in no acute distress. He is some diaphoresis present. He is alert and orient x3, grossly neurologically intact with symmetrical face, moist mucous membranes, no scleral icterus. Lungs are clear to auscultation bilaterally. Heart has a regular rate and rhythm. 2+ bilateral radial pulses. No edema of the bilateral lower extremities. Abdomen is obese but nontender to palpation. Range of motion of neck without restriction, no cervical lymphadenopathy.     Plan for EKG, pacemaker interrogation, IVF, glucose, trop, bnp, cbc and cmp. Observation    No c/f CVA, low c/f ACS, low c/f anemia, low c/f CHF exacerbation, no c/f infectious etiology; no c/f intoxication/withdrawal [NM]   1340 Cards note from 11/2020:Component Ref Range & Units 11/28/20 1144 11/27/20 0837  Left Ventricular Ejection Fraction % 55  54 R   LEFT VENTRICULAR EJECTION FRACTION MODE  Cardiac Cath             Last Resulted: 11/28/20 11:44         [NM]   1413 Creatinine(!): 2.0  Baseline 1.0 [NM]   1417 Patient received 1 L Fluids with EMS; will give another 500 cc and plan for repeat Cr evaluate aftewards [NM]   7630 Patient and any family at the bedside were updated regarding results from lab work/imaging/consultation discussion that was performed in the ED. All questions, comments, and concerns were answered as the bedside. Patient states he feels much better at this time. [NM]   1609    IMPRESSION:     1. Bilateral basilar airspace disease. 2. Cardiomegaly. Specimen Collected: 07/03/22 16:06         [NM]   4533 Patient and any family at the bedside were updated regarding results from lab work/imaging/consultation discussion that was performed in the ED. All questions, comments, and concerns were answered as the bedside. [NM]      ED Course User Index  [NM] Favio Beltran MD       Date of evaluation: 7/3/2022    Chief Complaint     Loss of Consciousness      History of Present Illness     Ray Jasso is a 74 y.o. male with a history of diabetes, nonischemic cardiomyopathy (most recent EF 4/2021 40-45%), hypertension, GI bleed, atrial fibrillation with PPM who presents with an episode of near syncope. The patient states that just prior to arrival he was outside, in his backyard grilling food for his family. He has been grilling outside since 5am over and open flame grill. The patient states that he was standing by the grill, suddenly felt \"like crap\", got sweaty.   He then sat down on the steps He has never used smokeless tobacco. He reports that he does not currently use alcohol. He reports that he does not currently use drugs. Medications     Discharge Medication List as of 7/3/2022  4:43 PM        CONTINUE these medications which have NOT CHANGED    Details   nitroGLYCERIN (MINITRAN) 0.2 MG/HR Place 1 patch onto the skin daily, Disp-90 patch, R-3Normal      pantoprazole (PROTONIX) 40 MG tablet Take 1 tablet by mouth 2 times daily (before meals) Take 40 mg PO twice daily x 8 weeks then daily thereafter., Disp-180 tablet,R-0Print      Cholecalciferol (VITAMIN D3) 125 MCG (5000 UT) CAPS Take 1 capsule by mouth daily, Disp-30 capsule,R-0Print      metFORMIN (GLUCOPHAGE) 1000 MG tablet Take 500 mg by mouth 2 times daily (with meals)Historical Med      linagliptin (TRADJENTA) 5 MG tablet Take 5 mg by mouth dailyHistorical Med      glipiZIDE (GLUCOTROL) 5 MG tablet Take 5 mg by mouth 2 times daily (before meals)Historical Med      methocarbamol (ROBAXIN) 750 MG tablet Take 750 mg by mouth 4 times dailyHistorical Med      aspirin 81 MG EC tablet Take 81 mg by mouth dailyHistorical Med             Allergies     He has No Known Allergies. Physical Exam     INITIAL VITALS: BP: 105/63, Temp: 98 °F (36.7 °C), Heart Rate: 74, Resp: 18, SpO2: 92 %  Physical Exam  Constitutional:       General: He is not in acute distress. Appearance: Normal appearance. He is obese. He is not ill-appearing or diaphoretic. HENT:      Head: Normocephalic and atraumatic. Right Ear: External ear normal.      Left Ear: External ear normal.      Nose: Nose normal.      Mouth/Throat:      Mouth: Mucous membranes are moist.      Pharynx: No oropharyngeal exudate or posterior oropharyngeal erythema. Eyes:      Pupils: Pupils are equal, round, and reactive to light. Cardiovascular:      Rate and Rhythm: Normal rate and regular rhythm. Pulses: Normal pulses. Heart sounds: Normal heart sounds. No murmur heard.     No gallop. Pulmonary:      Effort: Pulmonary effort is normal. No respiratory distress. Breath sounds: Normal breath sounds. No wheezing, rhonchi or rales. Abdominal:      General: Abdomen is flat. Bowel sounds are normal. There is no distension. Palpations: Abdomen is soft. Tenderness: There is no abdominal tenderness. There is no guarding or rebound. Musculoskeletal:      Cervical back: Normal range of motion and neck supple. No tenderness. Right lower leg: No edema. Left lower leg: No edema. Skin:     Capillary Refill: Capillary refill takes less than 2 seconds. Findings: No rash. Neurological:      Mental Status: He is alert. Comments: Alert and oriented x4. Cranial nerves II through XII gross intact bilaterally. Speech is fluent without dysarthria. Facial movements are symmetric. Pupils are equal and reactive to light, extract movements are intact. No pronator drift. Bilateral upper and lower extremity strength 5/5. Sensation is intact. Psychiatric:         Mood and Affect: Mood normal.         Behavior: Behavior normal.       Diagnostic Results     EKG   Interpreted in conjunction with emergency department physician Chely Coronel MD  Rhythm: paced  Rate: normal  Axis: normal  : none  Conduction: normal  ST Segments: no acute change  T Waves: no acute change  Q Waves:none  Clinical Impression: no acute changes  Comparison:  11/27/2020; new pacing since then    RADIOLOGY:  XR CHEST (2 VW)   Final Result      1. Bilateral basilar airspace disease. 2. Cardiomegaly.                 LABS:   Results for orders placed or performed during the hospital encounter of 07/03/22   CBC with Auto Differential   Result Value Ref Range    WBC 7.4 4.0 - 11.0 K/uL    RBC 3.88 (L) 4.20 - 5.90 M/uL    Hemoglobin 10.9 (L) 13.5 - 17.5 g/dL    Hematocrit 33.6 (L) 40.5 - 52.5 %    MCV 86.6 80.0 - 100.0 fL    MCH 28.2 26.0 - 34.0 pg    MCHC 32.6 31.0 - 36.0 g/dL    RDW 15.7 (H) 12.4 - 15.4 %    Platelets 358 353 - 083 K/uL    MPV 9.3 5.0 - 10.5 fL    Neutrophils % 69.6 %    Lymphocytes % 19.4 %    Monocytes % 9.4 %    Eosinophils % 1.3 %    Basophils % 0.3 %    Neutrophils Absolute 5.2 1.7 - 7.7 K/uL    Lymphocytes Absolute 1.4 1.0 - 5.1 K/uL    Monocytes Absolute 0.7 0.0 - 1.3 K/uL    Eosinophils Absolute 0.1 0.0 - 0.6 K/uL    Basophils Absolute 0.0 0.0 - 0.2 K/uL   Comprehensive Metabolic Panel w/ Reflex to MG   Result Value Ref Range    Sodium 139 136 - 145 mmol/L    Potassium reflex Magnesium 3.9 3.5 - 5.1 mmol/L    Chloride 101 99 - 110 mmol/L    CO2 25 21 - 32 mmol/L    Anion Gap 13 3 - 16    Glucose 110 (H) 70 - 99 mg/dL    BUN 17 7 - 20 mg/dL    Creatinine 2.0 (H) 0.8 - 1.3 mg/dL    GFR Non- 33 (A) >60    GFR  40 (A) >60    Calcium 8.7 8.3 - 10.6 mg/dL    Total Protein 7.0 6.4 - 8.2 g/dL    Albumin 3.9 3.4 - 5.0 g/dL    Albumin/Globulin Ratio 1.3 1.1 - 2.2    Total Bilirubin 0.3 0.0 - 1.0 mg/dL    Alkaline Phosphatase 56 40 - 129 U/L    ALT 24 10 - 40 U/L    AST 21 15 - 37 U/L   Troponin   Result Value Ref Range    Troponin <0.01 <0.01 ng/mL   Brain Natriuretic Peptide   Result Value Ref Range    Pro- 0 - 449 pg/mL   Basic Metabolic Panel w/ Reflex to MG   Result Value Ref Range    Sodium 137 136 - 145 mmol/L    Potassium reflex Magnesium 4.1 3.5 - 5.1 mmol/L    Chloride 99 99 - 110 mmol/L    CO2 29 21 - 32 mmol/L    Anion Gap 9 3 - 16    Glucose 108 (H) 70 - 99 mg/dL    BUN 17 7 - 20 mg/dL    Creatinine 1.7 (H) 0.8 - 1.3 mg/dL    GFR Non- 40 (A) >60    GFR  48 (A) >60    Calcium 8.5 8.3 - 10.6 mg/dL   Troponin   Result Value Ref Range    Troponin <0.01 <0.01 ng/mL   EKG 12 Lead   Result Value Ref Range    Ventricular Rate 74 BPM    Atrial Rate 74 BPM    P-R Interval 202 ms    QRS Duration 160 ms    Q-T Interval 454 ms    QTc Calculation (Bazett) 503 ms    P Axis 64 degrees    R Axis -46 degrees    T Axis 52 degrees Diagnosis       EKG performed in ER and to be interpreted by ER physician. Confirmed by MD, SHELBY ([id number]),  Colleen Stack [phone]) on 7/4/2022 4:04:04 PM       ED BEDSIDE ULTRASOUND:  No results found. RECENT VITALS:  BP: 98/65, Temp: 98 °F (36.7 °C), Heart Rate: 74, Resp: 18, SpO2: 92 %     Procedures     none    ED Course     Nursing Notes, Past Medical Hx,Past Surgical Hx, Social Hx, Allergies, and Family Hx were reviewed. ED Course as of 07/22/22 1551   Sun Jul 03, 2022   0213 Attending note: 68-year-old gentleman with history of nonischemic cardiomyopathy status post pacemaker and type 2 diabetes who presents to the emergency department after near syncopal event a few hours prior to arrival.  Patient notes that he has been taking all of his medications including his diuretics as prescribed, states that he feels that he was tolerating adequate p.o. specially water today but was outside since 5 AM in the heat and also cooking over an open flame grill. He stated that he felt lightheaded and sat down was very sweaty. Family was concerned so they called EMS for further evaluation patient was brought to the emergency department. He remembers all the events very well had no witnessed convulsive-like activity, denied any significant numbness weakness or paresthesias, nausea or vomiting or diarrhea, denies dysuria, denies fevers or chills, denied any preceding or afterwards chest pain or shortness of breath. Exam: patient is in no acute distress. He is some diaphoresis present. He is alert and orient x3, grossly neurologically intact with symmetrical face, moist mucous membranes, no scleral icterus. Lungs are clear to auscultation bilaterally. Heart has a regular rate and rhythm. 2+ bilateral radial pulses. No edema of the bilateral lower extremities. Abdomen is obese but nontender to palpation. Range of motion of neck without restriction, no cervical lymphadenopathy.     Plan for EKG, pacemaker interrogation, IVF, glucose, trop, bnp, cbc and cmp. Observation    No c/f CVA, low c/f ACS, low c/f anemia, low c/f CHF exacerbation, no c/f infectious etiology; no c/f intoxication/withdrawal [NM]   1340 Cards note from 11/2020:Component Ref Range & Units 11/28/20 1144 11/27/20 0837  Left Ventricular Ejection Fraction % 55  54 R   LEFT VENTRICULAR EJECTION FRACTION MODE  Cardiac Cath             Last Resulted: 11/28/20 11:44         [NM]   1413 Creatinine(!): 2.0  Baseline 1.0 [NM]   1417 Patient received 1 L Fluids with EMS; will give another 500 cc and plan for repeat Cr evaluate aftewards [NM]   1042 Patient and any family at the bedside were updated regarding results from lab work/imaging/consultation discussion that was performed in the ED. All questions, comments, and concerns were answered as the bedside. Patient states he feels much better at this time. [NM]   1609    IMPRESSION:     1. Bilateral basilar airspace disease. 2. Cardiomegaly. Specimen Collected: 07/03/22 16:06         [NM]   4060 Patient and any family at the bedside were updated regarding results from lab work/imaging/consultation discussion that was performed in the ED. All questions, comments, and concerns were answered as the bedside. [NM]      ED Course User Index  [NM] Oneda MD Walter       The patient was given the following medications:  Orders Placed This Encounter   Medications    lactated ringers bolus       CONSULTS:  None    MEDICAL DECISION MAKING / ASSESSMENT / Vero Domingo is a 74 y.o. male who presents with an episode of near syncope. The patient was outside grilling when he started noticing that he felt like he was going to pass out. He sat down, and quickly felt better. His family called the squad. On physical examination, the patient is initially slightly hypotensive to the high 90s over 50s. He is not tachycardic, saturating well on room air. He is afebrile.   He is well-appearing. He currently denies any symptoms or concerns. He has a nonfocal neurologic examination, no peripheral edema or other significant findings. Given the description of the patient's episode, I do suspect that he got overheated and/or is dehydrated. I have lower suspicion of cardiogenic source of his near syncopal episode. However, I did interrogate his [address] pacemaker. They stated that the only recent finding was 5 minutes of atrial fibrillation on 6/24, well before the symptoms started. Otherwise, no events. I also did obtain CBC, BMP, troponin, BNP, EKG and chest x-ray. CBC shows no leukocytosis or acute anemia. BMP shows normal electrolytes, however his kidney function has doubled from baseline, it is currently 2.0. This is likely related to dehydration, which as stated, is the likely etiology of his near syncopal episode. Therefore, the patient will be hydrated with a total of 1.5 L of fluids and his kidney function will be rechecked. Otherwise, his BNP is approximate 200, making heart failure exacerbation unlikely. EKG shows no acute ischemia or infarct. Troponin is less than 0.01 and repeat is pending. Chest x-ray shows no acute cardiopulmonary abnormalities. At this time, I am going off service and [facility] At this time, repeat troponin, repeat BMP after fluid administration is pending at this time. The patient will likely be able to discharge home with close outpatient follow-up and strict return precautions. This patient was also evaluated by the attending physician. All care plans were discussed and agreed upon. Clinical Impression     1. NIKOLAY (acute kidney injury) ([facility] [address]    2.  Near syncope        Disposition     PATIENT REFERRED TO:  Yaima Tafoya MD  [address] [facility]  [phone]    Schedule an appointment as soon as possible for a visit       [facility]. Emergency [address] [facility]  [phone]  Go to   If symptoms worsen      DISCHARGE MEDICATIONS:  Discharge Medication List as of 7/3/2022  4:43 PM          [address] [facility] [address]  07/03/22 1611       83 Mikaela Barnard MD  07/04/22 [address]  07/11/22 [address] [place], [place]  07/11/22 1331       83 Mikaela Barnard MD  07/22/22 [id number]

## 2022-07-03 NOTE — ED PROVIDER NOTES
Anand ASSISTANT NOTE     ED Attending Attestation Note     Date of evaluation: 7/3/2022    This patient was seen by the advance practice provider. I have seen and examined the patient, agree with the workup, evaluation, management and diagnosis. The care plan has been discussed. I have reviewed the ECG and concur with the ASHER's interpretation. My assessment reveals:   ED Course as of 07/13/22 1337   Sun Jul 03, 2022   4095 Attending note: 68-year-old gentleman with history of nonischemic cardiomyopathy status post pacemaker and type 2 diabetes who presents to the emergency department after near syncopal event a few hours prior to arrival.  Patient notes that he has been taking all of his medications including his diuretics as prescribed, states that he feels that he was tolerating adequate p.o. specially water today but was outside since 5 AM in the heat and also cooking over an open flame grill. He stated that he felt lightheaded and sat down was very sweaty. Family was concerned so they called EMS for further evaluation patient was brought to the emergency department. He remembers all the events very well had no witnessed convulsive-like activity, denied any significant numbness weakness or paresthesias, nausea or vomiting or diarrhea, denies dysuria, denies fevers or chills, denied any preceding or afterwards chest pain or shortness of breath. Exam: patient is in no acute distress. He is some diaphoresis present. He is alert and orient x3, grossly neurologically intact with symmetrical face, moist mucous membranes, no scleral icterus. Lungs are clear to auscultation bilaterally. Heart has a regular rate and rhythm. 2+ bilateral radial pulses. No edema of the bilateral lower extremities. Abdomen is obese but nontender to palpation. Range of motion of neck without restriction, no cervical lymphadenopathy.     Plan for EKG, pacemaker interrogation, IVF, glucose, trop, bnp, cbc and cmp. Observation    No c/f CVA, low c/f ACS, low c/f anemia, low c/f CHF exacerbation, no c/f infectious etiology; no c/f intoxication/withdrawal [NM]   1340 Cards note from 11/2020:Component Ref Range & Units 11/28/20 1144 11/27/20 0837  Left Ventricular Ejection Fraction % 55  54 R   LEFT VENTRICULAR EJECTION FRACTION MODE  Cardiac Cath             Last Resulted: 11/28/20 11:44         [NM]   1413 Creatinine(!): 2.0  Baseline 1.0 [NM]   1417 Patient received 1 L Fluids with EMS; will give another 500 cc and plan for repeat Cr evaluate aftewards [NM]   8897 Patient and any family at the bedside were updated regarding results from lab work/imaging/consultation discussion that was performed in the ED. All questions, comments, and concerns were answered as the bedside. Patient states he feels much better at this time. [NM]   1609    IMPRESSION:     1. Bilateral basilar airspace disease. 2. Cardiomegaly.                Specimen Collected: 07/03/22 16:06         [NM]   6523 Patient and any family at the bedside were updated regarding results from lab work/imaging/consultation discussion that was performed in the ED. All questions, comments, and concerns were answered as the bedside. [NM]      ED Course User Index  [NM] Ja Burch MD       Date of evaluation: 7/3/2022    ADDENDUM:      Care of this patient was assumed from [facility]. The patient was seen for Loss of Consciousness  . The patient's initial evaluation and plan have been discussed with the prior provider who initially evaluated the patient. Nursing Notes, Past Medical Hx, Past Surgical Hx, Social Hx, Allergies, and Family Hx were all reviewed. Diagnostic Results     RADIOLOGY:  XR CHEST (2 VW)   Final Result      1. Bilateral basilar airspace disease. 2. Cardiomegaly.                 LABS:   Results for orders placed or performed during the hospital encounter of 07/03/22 CBC with Auto Differential   Result Value Ref Range    WBC 7.4 4.0 - 11.0 K/uL    RBC 3.88 (L) 4.20 - 5.90 M/uL    Hemoglobin 10.9 (L) 13.5 - 17.5 g/dL    Hematocrit 33.6 (L) 40.5 - 52.5 %    MCV 86.6 80.0 - 100.0 fL    MCH 28.2 26.0 - 34.0 pg    MCHC 32.6 31.0 - 36.0 g/dL    RDW 15.7 (H) 12.4 - 15.4 %    Platelets 048 780 - 555 K/uL    MPV 9.3 5.0 - 10.5 fL    Neutrophils % 69.6 %    Lymphocytes % 19.4 %    Monocytes % 9.4 %    Eosinophils % 1.3 %    Basophils % 0.3 %    Neutrophils Absolute 5.2 1.7 - 7.7 K/uL    Lymphocytes Absolute 1.4 1.0 - 5.1 K/uL    Monocytes Absolute 0.7 0.0 - 1.3 K/uL    Eosinophils Absolute 0.1 0.0 - 0.6 K/uL    Basophils Absolute 0.0 0.0 - 0.2 K/uL   Comprehensive Metabolic Panel w/ Reflex to MG   Result Value Ref Range    Sodium 139 136 - 145 mmol/L    Potassium reflex Magnesium 3.9 3.5 - 5.1 mmol/L    Chloride 101 99 - 110 mmol/L    CO2 25 21 - 32 mmol/L    Anion Gap 13 3 - 16    Glucose 110 (H) 70 - 99 mg/dL    BUN 17 7 - 20 mg/dL    CREATININE 2.0 (H) 0.8 - 1.3 mg/dL    GFR Non- 33 (A) >60    GFR  40 (A) >60    Calcium 8.7 8.3 - 10.6 mg/dL    Total Protein 7.0 6.4 - 8.2 g/dL    Albumin 3.9 3.4 - 5.0 g/dL    Albumin/Globulin Ratio 1.3 1.1 - 2.2    Total Bilirubin 0.3 0.0 - 1.0 mg/dL    Alkaline Phosphatase 56 40 - 129 U/L    ALT 24 10 - 40 U/L    AST 21 15 - 37 U/L   Troponin   Result Value Ref Range    Troponin <0.01 <0.01 ng/mL   Brain Natriuretic Peptide   Result Value Ref Range    Pro- 0 - 449 pg/mL   Basic Metabolic Panel w/ Reflex to MG   Result Value Ref Range    Sodium 137 136 - 145 mmol/L    Potassium reflex Magnesium 4.1 3.5 - 5.1 mmol/L    Chloride 99 99 - 110 mmol/L    CO2 29 21 - 32 mmol/L    Anion Gap 9 3 - 16    Glucose 108 (H) 70 - 99 mg/dL    BUN 17 7 - 20 mg/dL    CREATININE 1.7 (H) 0.8 - 1.3 mg/dL    GFR Non- 40 (A) >60    GFR  48 (A) >60    Calcium 8.5 8.3 - 10.6 mg/dL   Troponin   Result Value Ref Range    Troponin <0.01 <0.01 ng/mL   EKG 12 Lead   Result Value Ref Range    Ventricular Rate 74 BPM    Atrial Rate 74 BPM    P-R Interval 202 ms    QRS Duration 160 ms    Q-T Interval 454 ms    QTc Calculation (Bazett) 503 ms    P Axis 64 degrees    R Axis -46 degrees    T Axis 52 degrees    Diagnosis       EKG performed in ER and to be interpreted by ER physician. Confirmed by MD, ER ([id number]),  Kanu Carey [phone]) on 7/4/2022 4:04:04 PM       RECENT VITALS:  BP: 98/65, Temp: 98 °F (36.7 °C), Heart Rate: 74, Resp: 18, SpO2: 92 %       ED Course     The patient was given the following medications:  Orders Placed This Encounter   Medications    lactated ringers bolus       CONSULTS:  None    MEDICAL DECISION MAKING / ASSESSMENT / Alonzo Bailey is a 74 y.o. male who presents the emergency room with near syncope. Vital signs stable on presentation remained stable throughout his stay. Please see previous providers documentation for initial HPI. On turnover the patient, we are awaiting repeat BMP, repeat troponin, and chest x-ray. Repeat BMP came back with an improving creatinine to 1.7. Repeat troponin was negative. Chest x-ray with bilateral basilar airspace disease and cardiomegaly. No other acute abnormality. On reassessment, patient is feeling well. I did discuss that his creatinine is improving, however not back to normal.  I had a shared decision-making conversation with the patient about admission due to near syncope and NIKOLAY versus being discharged home. Patient states he is feeling well and he would like to go home at this time. I believe this is reasonable. He was told to contact his primary care provider to discuss outpatient follow-up within the next week to have his labs rechecked. Patient is agreeable with the plan and was given strict return precautions prior to discharge    This patient was also evaluated by the attending physician.  All care plans were discussed and agreed upon. Clinical Impression     1. NIKOLAY (acute kidney injury) ([facility] [address]    2.  Near syncope        Disposition     PATIENT REFERRED TO:  Olga Majano MD  [address] [place]  [phone]    Schedule an appointment as soon as possible for a visit       [facility] Emergency Department  [address] [address]  [address]  [phone]  Go to   If symptoms worsen      DISCHARGE MEDICATIONS:  Discharge Medication List as of 7/3/2022  4:43 PM          DISPOSITION Decision To Discharge 07/03/2022 04:23:59 PM        Kelly Castro PA-C  07/03/22 2122       Avel Goodrich MD  07/13/22 [id number]

## 2022-07-04 LAB
EKG ATRIAL RATE: 74 BPM
EKG DIAGNOSIS: NORMAL
EKG P AXIS: 64 DEGREES
EKG P-R INTERVAL: 202 MS
EKG Q-T INTERVAL: 454 MS
EKG QRS DURATION: 160 MS
EKG QTC CALCULATION (BAZETT): 503 MS
EKG R AXIS: -46 DEGREES
EKG T AXIS: 52 DEGREES
EKG VENTRICULAR RATE: 74 BPM

## (undated) DEVICE — CANNULA SAMP CO2 AD GRN 7FT CO2 AND 7FT O2 TBNG UNIV CONN

## (undated) DEVICE — FORCEPS BX L240CM JAW DIA2.4MM ORNG L CAP W/ NDL DISP RAD